# Patient Record
Sex: MALE | Race: WHITE | Employment: FULL TIME | URBAN - METROPOLITAN AREA
[De-identification: names, ages, dates, MRNs, and addresses within clinical notes are randomized per-mention and may not be internally consistent; named-entity substitution may affect disease eponyms.]

---

## 2017-05-07 ENCOUNTER — HOSPITAL ENCOUNTER (OUTPATIENT)
Age: 42
Discharge: HOME | End: 2017-05-07
Attending: NURSE PRACTITIONER
Payer: OTHER GOVERNMENT

## 2017-05-07 DIAGNOSIS — M54.10 RADICULOPATHY AFFECTING UPPER EXTREMITY: ICD-10-CM

## 2017-05-07 DIAGNOSIS — R20.2 PARESTHESIA OF LEFT UPPER EXTREMITY: ICD-10-CM

## 2017-05-07 PROCEDURE — 73221 MRI JOINT UPR EXTREM W/O DYE: CPT

## 2018-09-11 ENCOUNTER — OFFICE VISIT (OUTPATIENT)
Dept: ORTHOPEDIC SURGERY | Age: 43
End: 2018-09-11

## 2018-09-11 VITALS
DIASTOLIC BLOOD PRESSURE: 86 MMHG | BODY MASS INDEX: 25.33 KG/M2 | TEMPERATURE: 98 F | HEART RATE: 61 BPM | OXYGEN SATURATION: 100 % | WEIGHT: 187 LBS | HEIGHT: 72 IN | RESPIRATION RATE: 16 BRPM | SYSTOLIC BLOOD PRESSURE: 123 MMHG

## 2018-09-11 DIAGNOSIS — R20.0 NUMBNESS OF LEFT HAND: ICD-10-CM

## 2018-09-11 DIAGNOSIS — M62.838 MUSCLE SPASM: ICD-10-CM

## 2018-09-11 DIAGNOSIS — M47.812 CERVICAL FACET JOINT SYNDROME: ICD-10-CM

## 2018-09-11 DIAGNOSIS — M48.02 CERVICAL SPINAL STENOSIS: ICD-10-CM

## 2018-09-11 DIAGNOSIS — M48.02 FORAMINAL STENOSIS OF CERVICAL REGION: Primary | ICD-10-CM

## 2018-09-11 RX ORDER — METHYLPREDNISOLONE 4 MG/1
TABLET ORAL
Qty: 1 DOSE PACK | Refills: 0 | Status: SHIPPED | OUTPATIENT
Start: 2018-09-11 | End: 2018-11-29 | Stop reason: ALTCHOICE

## 2018-09-11 NOTE — MR AVS SNAPSHOT
[address] [facility] [address] [facility] [address] 
[phone] Patient: Regine Galo MRN: [id number] :1975 Visit Information Date & Time Provider Department Dept. Phone Encounter #  
 2018  9:00 AM Panda Dubon, [facility] [phone] [id number] Follow-up Instructions Return in about 6 weeks (around 10/23/2018) for PT follow up, Medication follow up on Thursday. Upcoming Health Maintenance Date Due Pneumococcal 19-64 Medium Risk (1 of 1 - PPSV23) 1993 DTaP/Tdap/Td series (1 - Tdap) 1995 Influenza Age 5 to Adult 2018 Allergies as of 2018  Review Complete On: 2018 By: Janae Wood LPN Severity Noted Reaction Type Reactions Ciprofloxacin  2017    Other (comments) Pcn [Penicillins]  2017    Other (comments) Penicillins  2015    Hives Current Immunizations  Never Reviewed No immunizations on file. Not reviewed this visit You Were Diagnosed With   
  
 Codes Comments Foraminal stenosis of cervical region    -  Primary ICD-10-CM: M99.81 ICD-9-CM: 723.0 Numbness of left hand     ICD-10-CM: R20.0 ICD-9-CM: 782.0 Cervical facet joint syndrome (HCC)     ICD-10-CM: M46.92 
ICD-9-CM: 723.8 Vitals BP Pulse Temp Resp Height(growth percentile) Weight(growth percentile) 123/86 (BP 1 Location: Left arm, BP Patient Position: Sitting) 61 98 °F (36.7 °C) (Oral) 16 6' (1.829 m) 187 lb (84.8 kg) SpO2 BMI Smoking Status 100% 25.36 kg/m2 Never Smoker Vitals History BMI and BSA Data Body Mass Index Body Surface Area  
 25.36 kg/m 2 2.08 m 2 Preferred Pharmacy Pharmacy Name Phone [facility], [address] [phone] Your Updated Medication List  
  
   
 This list is accurate as of 9/11/18 10:38 AM.  Always use your most recent med list.  
  
  
  
  
 ADVAIR DISKUS 100-50 mcg/dose diskus inhaler Generic drug:  fluticasone-salmeterol Take 1 Puff by inhalation every twelve (12) hours. buPROPion  mg XL tablet Commonly known as:  WELLBUTRIN XL  
  
 methylPREDNISolone 4 mg tablet Commonly known as:  Arlene Sawantjuliann Per dose pack instructions SYNTHROID 150 mcg tablet Generic drug:  levothyroxine Prescriptions Sent to Pharmacy Refills  
 methylPREDNISolone (MEDROL DOSEPACK) 4 mg tablet 0 Sig: Per dose pack instructions Class: Normal  
 [facility] [id number] [address] [facility], [address] Ph #: [phone] We Performed the Following AMB SUPPLY ORDER [[id number] Custom] Comments:  
 Left Wrist Splint REFERRAL TO PHYSICAL THERAPY [UBI41 Custom] Comments:  
 Eval/Treat Cervical PT, Dry Needling Follow-up Instructions Return in about 6 weeks (around 10/23/2018) for PT follow up, Medication follow up on Thursday. Referral Information Referral ID Referred By Referred To  
  
 [id number] [facility] [address] [facility]   
   [facility] [address] [place], [address] [place] [address] Phone: [phone] Visits Status Start Date End Date 1 New Request 9/11/18 9/11/19 If your referral has a status of pending review or denied, additional information will be sent to support the outcome of this decision. Patient Instructions Neck Arthritis: Exercises Your Care Instructions Here are some examples of typical rehabilitation exercises for your condition. Start each exercise slowly. Ease off the exercise if you start to have pain. Your doctor or physical therapist will tell you when you can start these exercises and which ones will work best for you. How to do the exercises Neck stretches to the side 1. This stretch works best if you keep your shoulder down as you lean away from it. To help you remember to do this, start by relaxing your shoulders and lightly holding on to your thighs or your chair. 2. Tilt your head toward your shoulder and hold for 15 to 30 seconds. Let the weight of your head stretch your muscles. 3. Repeat 2 to 4 times toward each shoulder. Chin tuck 1. Lie on the floor with a rolled-up towel under your neck. Your head should be touching the floor. 2. Slowly bring your chin toward your chest. 
3. Hold for a count of 6, and then relax for up to 10 seconds. 4. Repeat 8 to 12 times. Active cervical rotation 1. Sit in a firm chair, or stand up straight. 2. Keeping your chin level, turn your head to the right, and hold for 15 to 30 seconds. 3. Turn your head to the left and hold for 15 to 30 seconds. 4. Repeat 2 to 4 times to each side. Shoulder blade squeeze 1. While standing, squeeze your shoulder blades together. 2. Do not raise your shoulders up as you are squeezing. 3. Hold for 6 seconds. 4. Repeat 8 to 12 times. Shoulder rolls 1. Sit comfortably with your feet shoulder-width apart. You can also do this exercise standing up. 2. Roll your shoulders up, then back, and then down in a smooth, circular motion. 3. Repeat 2 to 4 times. Follow-up care is a key part of your treatment and safety. Be sure to make and go to all appointments, and call your doctor if you are having problems. It's also a good idea to know your test results and keep a list of the medicines you take. Where can you learn more? Go to http://aubrey-ruben.info/. Enter E991 in the search box to learn more about \"Neck Arthritis: Exercises. \" Current as of: November 29, 2017 Content Version: 11.7 © 3660-8236 [employer].  Care instructions adapted under license by [facility] (which disclaims liability or warranty for this information). If you have questions about a medical condition or this instruction, always ask your healthcare professional. [address] any warranty or liability for your use of this information. Introducing [facility] SERVICES! [employer] introduces [employer] patient portal. Now you can access parts of your medical record, email your doctor's office, and request medication refills online. 1. In your internet browser, go to [email or web address] 2. Click on the First Time User? Click Here link in the Sign In box. You will see the New Member Sign Up page. 3. Enter your [employer] Access Code exactly as it appears below. You will not need to use this code after youve completed the sign-up process. If you do not sign up before the expiration date, you must request a new code. · [employer] Access Code: [email or web address] Expires: 10/3/2018 12:32 PM 
 
4. Enter the last four digits of your Social Security Number (xxxx) and Date of Birth (mm/dd/yyyy) as indicated and click Submit. You will be taken to the next sign-up page. 5. Create a [employer] ID. This will be your [employer] login ID and cannot be changed, so think of one that is secure and easy to remember. 6. Create a [employer] password. You can change your password at any time. 7. Enter your Password Reset Question and Answer. This can be used at a later time if you forget your password. 8. Enter your e-mail address. You will receive e-mail notification when new information is available in [address]. 9. Click Sign Up. You can now view and download portions of your medical record. 10. Click the Download Summary menu link to download a portable copy of your medical information. If you have questions, please visit the Frequently Asked Questions section of the [employer] website. Remember, [employer] is NOT to be used for urgent needs. For medical emergencies, dial 911. Now available from your iPhone and Android! Please provide this summary of care documentation to your next provider. Your primary care clinician is listed as Don Lacy. If you have any questions after today's visit, please call [phone].

## 2018-09-11 NOTE — PROGRESS NOTES
Patient tried on multiple sizes of LT Wrist Splint. Patient will need a Large Left Spectral Wrist Splint, however, we do not have any in stock. More Wrist Splints were ordered by our Supplier , patient aware, patient willing to come back to office when wrist splints are available.

## 2018-09-11 NOTE — PROGRESS NOTES
[facility]  [address], [facility] [address]  Phone: [phone]  Fax: [phone]    NEW PATIENT  Pt's YOB: 1975    ASSESSMENT   Diagnoses and all orders for this visit:    1. Foraminal stenosis of cervical region  -     REFERRAL TO PHYSICAL THERAPY    2. Numbness of left hand  -     REFERRAL TO PHYSICAL THERAPY  -     Generic Supply Order    3. Cervical facet joint syndrome (HCC)  -     methylPREDNISolone (MEDROL DOSEPACK) 4 mg tablet; Per dose pack instructions  -     REFERRAL TO PHYSICAL THERAPY    4. Cervical spinal stenosis    5. Muscle spasm         IMPRESSION AND PLAN:  Keli Hein is a 39 y.o. male with history of neck pain. He complains of neck pain and pain across the upper back. Pt also complains of headaches and numbness in the left hand. Particularly the thumb and index fingers. He tried physical therapy in the past with traction but has never tried dry needling. 1) Pt was given information on cervical arthritis exercises. 2) He was referred to cervical physical therapy with dry needling. 3) Pt was given information on how to use a TheraCane. 4) He was prescribed a Medrol Dosepak. 5) Pt was prescribed a left wrist splint. 6) He will discuss starting Celebrex with his PCP, Dr. July Bernardo. 7) Mr. Hakan Finley has a reminder for a \"due or due soon\" health maintenance. I have asked that he contact his primary care provider, Jerad Bustillos MD, for follow-up on this health maintenance. 8)  demonstrated consistency with prescribing. 9) Pt will follow-up in 6 weeks or sooner if needed. HISTORY OF PRESENT ILLNESS:  Keli Hein is a 39 y.o. male with history of neck pain. He has been followed by Dr. Stephenie Mcardle and presents to the office today for a second opinion. In 2006 he woke up with severe pain in the right upper back.  Pt notes that he has experienced upper back pain and headaches daily since this incident but this is generally well managed with exercise. In 04/2017, he woke up with pain radiating down the left arm with numbness in the left hand (particularly the thumb and index fingers) when extending the neck. He notes that he experiences pain in the neck and upper back when he lays on his right side so he generally lays on his back. Pt denies any weakness in the arms or legs but complains of intermittent cramping in the arms. He admits to relief with the Bakody's maneuver. Pt reports that he has a SLAP tear in the left shoulder. He followed up with Dr. Ihsan Mann who referred him to Dr. José Miguel Marmolejo. Pt states that Dr. José Miguel Marmolejo recommended he had C6-7 disc replacement. He had an EMG a couple months ago, was referred to a hand specialist, and was told that he does not have carpal tunnel. Of note, patient is a  and notes that his left hand numbness occasionally interferes with work. 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 He tried cervical physical therapy and traction about a year ago and has home traction unit. Pt notes that he tried facet injections while serving in [employer] but is unsure if they were effective (noting that he was in minimal pain the day of the procedure). He has discussed trying a RFA in the past. Pt has tried trigger point injections in the past but has not tried dry needling. He has a history of stomach ulcers so he notes that he has avoided NSAID's. Pt states that he has been taking Tylenol as needed. Pt at this time desires to proceed with medication evaluation and a left wrist splint. 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     Pain Scale: 2/10     PCP: Kumar Ansari MD    Past Medical History:   Diagnosis Date    Asthma     Graves disease     Ulcer         Social History     Social History    Marital status: UNKNOWN     Spouse name: N/A    Number of children: N/A    Years of education: N/A     Occupational History    Not on file. Social History Main Topics    Smoking status: Never Smoker    Smokeless tobacco: Never Used    Alcohol use Yes    Drug use: Not on file    Sexual activity: Not on file     Other Topics Concern    Not on file     Social History Narrative       Current Outpatient Prescriptions   Medication Sig Dispense Refill    methylPREDNISolone (MEDROL DOSEPACK) 4 mg tablet Per dose pack instructions 1 Dose Pack 0    buPROPion XL (WELLBUTRIN XL) 300 mg XL tablet       fluticasone-salmeterol (ADVAIR DISKUS) 100-50 mcg/dose diskus inhaler Take 1 Puff by inhalation every twelve (12) hours.  SYNTHROID 150 mcg tablet          Allergies   Allergen Reactions    Ciprofloxacin Other (comments)    Pcn [Penicillins] Other (comments)    Penicillins Hives       REVIEW OF SYSTEMS    Constitutional: Negative for fever, chills, or weight change. Respiratory: Negative for cough or shortness of breath. Cardiovascular: Negative for chest pain or palpitations. Gastrointestinal: Negative for acid reflux, change in bowel habits, or constipation. Genitourinary: Negative for dysuria and flank pain. Musculoskeletal: Positive for cervical pain. Skin: Negative for rash. Neurological: Positive for headaches. Negative for dizziness. Positive for numbness in the left hand. Endo/Heme/Allergies: Negative for increased bruising. Psychiatric/Behavioral: Positive for difficulty with sleep. PHYSICAL EXAMINATION  Visit Vitals    /86 (BP 1 Location: Left arm, BP Patient Position: Sitting)    Pulse 61    Temp 98 °F (36.7 °C) (Oral)    Resp 16    Ht 6' (1.829 m)    Wt 187 lb (84.8 kg)    SpO2 100%    BMI 25.36 kg/m2       Constitutional: Awake, alert, and in no acute distress. HEENT: Normocephalic. Atraumatic. Oropharynx is moist and clear. PERRL. EOMI.  Sclerae are nonicteric  Heart: Regular rate and rhythm  Lungs: Clear to auscultation bilaterally  Abdomen: Soft and nontender. Bowel sounds are present  Neurological: 1+ symmetrical DTRs in the upper extremities. 1+ symmetrical DTRs in the lower extremities. Sensation to light touch is intact. Negative Penny's sign bilaterally. Skin: warm, dry, and intact. Musculoskeletal: Tight across the upper trapezii with scattered trigger points. Positive Bakody's sign. Decreased range of motion with left cervical flexion; otherwise good range of motion. Pain with cervical extension. Good range of motion of both shoulders. No tenderness to palpation in the lower lumbar region. No pain with extension, axial loading, or forward flexion. No pain with internal or external rotation of his hips. Negative straight leg raise bilaterally. Biceps  Triceps Deltoids Wrist Ext Wrist Flex Hand Intrin   Right +4/5 +4/5 +4/5 +4/5 +4/5 +4/5   Left +4/5 +4/5 +4/5 +4/5 +4/5 +4/5      Hip Flex  Quads Hamstrings Ankle DF EHL Ankle PF   Right +4/5 +4/5 +4/5 +4/5 +4/5 +4/5   Left +4/5 +4/5 +4/5 +4/5 +4/5 +4/5     IMAGING:    Cervical spine MRI from 05/21/2017 was personally reviewed with the patient and demonstrated:  FINDINGS:  DEGENERATIVE DISEASE: Mild disc desiccation and loss of disc space height throughout the cervical spine. Alignment good. OSSEOUS STRUCTURES:  No evidence of fracture or suspicious bone lesion. Faint STIR hyperintensity within the C5 and C6 vertebral bodies, without discrete lesion. CORD AND CRANIOCERVICAL JUNCTION:   No signal abnormality or contour deforming mass. SPINAL CANAL AND NEURAL FORAMEN (axial images obtained from C2/3 through C7/T1):    C2/3: No disc protrusion and no spinal canal or neural foramen stenosis.       C3/4: No disc protrusion or canal stenosis. Mild bilateral degenerative neural foramen stenosis.       C4/5: No disc protrusion or canal stenosis.  Uncovertebral hypertrophy mild to moderately narrows the right neural foramen.      C5/6: No disc protrusion or canal stenosis. Uncovertebral hypertrophy mild to moderately narrows both neural foramen, slightly worse on the right.      C6/7: Minimal disc bulge, mildly narrows the spinal canal without significant cord deformity. Uncovertebral hypertrophy severely narrows the left and moderate to severely narrows the right neural foramen.      C7/T1: No disc protrusion and no spinal canal or neural foramen stenosis.      IMPRESSION:  1. C6/7: High-grade bilateral neural foramen stenosis. Mild canal stenosis. 2. No significant spinal canal or neural foramen stenosis at any other level. 3. Faint signal abnormality within the C5 and C6 vertebral bodies, without discrete lesion. While of unclear etiology, in the absence of known primary malignancy finding very likely benign, possibly reactive degenerative marrow changes. Left upper extremity EMG from 07/20/2018 was personally reviewed with the patient and demonstrated: This EMG study, as performed, shows a subtle abnormality in the form of the absence of H-reflex, as well as prolonged F-latency, from the ulnar nerve; however, there is no hard evidence to suggest cervical radiculopathy. No distal entrapment neuropathy is notes. Written by Angie Drake, as dictated by Eleanor Barnard MD.  I, Dr. Eleanor Barnard confirm that all documentation is accurate.

## 2018-09-11 NOTE — PATIENT INSTRUCTIONS
Neck Arthritis: Exercises  Your Care Instructions  Here are some examples of typical rehabilitation exercises for your condition. Start each exercise slowly. Ease off the exercise if you start to have pain. Your doctor or physical therapist will tell you when you can start these exercises and which ones will work best for you. How to do the exercises  Neck stretches to the side    1. This stretch works best if you keep your shoulder down as you lean away from it. To help you remember to do this, start by relaxing your shoulders and lightly holding on to your thighs or your chair. 2. Tilt your head toward your shoulder and hold for 15 to 30 seconds. Let the weight of your head stretch your muscles. 3. Repeat 2 to 4 times toward each shoulder. Chin tuck    1. Lie on the floor with a rolled-up towel under your neck. Your head should be touching the floor. 2. Slowly bring your chin toward your chest.  3. Hold for a count of 6, and then relax for up to 10 seconds. 4. Repeat 8 to 12 times. Active cervical rotation    1. Sit in a firm chair, or stand up straight. 2. Keeping your chin level, turn your head to the right, and hold for 15 to 30 seconds. 3. Turn your head to the left and hold for 15 to 30 seconds. 4. Repeat 2 to 4 times to each side. Shoulder blade squeeze    1. While standing, squeeze your shoulder blades together. 2. Do not raise your shoulders up as you are squeezing. 3. Hold for 6 seconds. 4. Repeat 8 to 12 times. Shoulder rolls    1. Sit comfortably with your feet shoulder-width apart. You can also do this exercise standing up. 2. Roll your shoulders up, then back, and then down in a smooth, circular motion. 3. Repeat 2 to 4 times. Follow-up care is a key part of your treatment and safety. Be sure to make and go to all appointments, and call your doctor if you are having problems. It's also a good idea to know your test results and keep a list of the medicines you take.   Where can you learn more? Go to http://aubrey-ruben.info/. Enter D274 in the search box to learn more about \"Neck Arthritis: Exercises. \"  Current as of: November 29, 2017  Content Version: 11.7  © 0262-8564 [employer]. Care instructions adapted under license by [employer] (which disclaims liability or warranty for this information). If you have questions about a medical condition or this instruction, always ask your healthcare professional. [address] any warranty or liability for your use of this information.

## 2018-09-24 ENCOUNTER — APPOINTMENT (OUTPATIENT)
Dept: PHYSICAL THERAPY | Age: 43
End: 2018-09-24

## 2018-10-03 ENCOUNTER — HOSPITAL ENCOUNTER (OUTPATIENT)
Dept: PHYSICAL THERAPY | Age: 43
Discharge: HOME | End: 2018-10-03
Payer: OTHER GOVERNMENT

## 2018-10-03 PROCEDURE — 97162 PT EVAL MOD COMPLEX 30 MIN: CPT

## 2018-10-03 PROCEDURE — 97014 ELECTRIC STIMULATION THERAPY: CPT

## 2018-10-03 PROCEDURE — 97110 THERAPEUTIC EXERCISES: CPT

## 2018-10-03 NOTE — PROGRESS NOTES
[address] [facility] PHYSICAL THERAPY AT [address] [place] [address]  Phone: [phone] Fax: [phone] / [address] [address] PHYSICAL THERAPY SERVICES  Patient Name: Dante Pickering : 1975   Medical   Diagnosis: Chronic upper back pain [M54.9, G89.29] Treatment Diagnosis: CS and TS myofascial pain with radiculopathy    Onset Date:      Referral Source: Avtar Owens MD Start of Care [facility]): 10/3/2018   Prior Hospitalization: See medical history Provider #: [id number]   Prior Level of Function: Functional I    Comorbidities: Thyroid , asthma    Medications: Verified on Patient Summary List   The Plan of Care and following information is based on the information from the initial evaluation.   ========================================================================  Assessment / key information:  Pt is a 39y.o. year old male who presents with co CS/ TS pain started in  insidious onset (awoke with pain) with progressive worsening to include N/T (thumb and forefinger) and shooting pains into the L UE. Past treatment included PT for strengthening and traction (no change in sx), facet block and epidural injections without lasting pain relief. Pain management via Tylenol and ice as needed and home trigger point release. PMH significant for SLAP tear L shoulder. MRI show moderate to severe narrowing of C6-7 and moderate narrowing at C5-6. Current deficits include: increased pain to 8/10 at worst, pain with CS R rotation, B TTP of medial scapula, decreased postural strength SA 4/5, LT 3+/5, and + Spurling L to UE pain. Functional deficits include: sleeping on R side- nightly sleep interruption, work duties: carrying back pack with tools, prolonged looking up. Home exercise program initiated on initial evaluation to address these deficits.  Pt would benefit from PT to address these deficits for increased functional mobility and QOL.  ========================================================================  Eval Complexity: History: MEDIUM  Complexity : 1-2 comorbidities / personal factors will impact the outcome/ POC Exam:HIGH Complexity : 4+ Standardized tests and measures addressing body structure, function, activity limitation and / or participation in recreation  Presentation: MEDIUM Complexity : Evolving with changing characteristics  Clinical Decision Making:MEDIUM Complexity : FOTO score of 26-74Overall Complexity:MEDIUM  Problem List: pain affecting function, decrease strength, impaired gait/ balance, decrease ADL/ functional abilitiies, decrease activity tolerance, decrease flexibility/ joint mobility and other FOTO 63   Treatment Plan may include any combination of the following: Therapeutic exercise, Therapeutic activities, Neuromuscular re-education, Physical agent/modality, Gait/balance training, Manual therapy, Aquatic therapy, Patient education, Self Care training, Functional mobility training, Home safety training, Stair training and Other: IMT/ dry needling   Patient / Family readiness to learn indicated by: asking questions, trying to perform skills and interest  Persons(s) to be included in education: patient (P)  Barriers to Learning/Limitations: None  Measures taken:    Patient Goal (s): \"reduce upper back pain \"   Patient self reported health status: good  Rehabilitation Potential: good   Short Term Goals: To be accomplished in  1  weeks:  1. Pt will be independent and compliant with HEP   Long Term Goals: To be accomplished in  8-12 treatments:  1. Patient will increase FOTO score to 71/100 for indications of increased functional mobility. 2.  Patient will demo increased SA strength to 4+/5 for improved postural strength with work duties   3. Patient will demo LT strength 4/5 for improve stability with R SL for sleeping comfort   4.  Patient will demo negative L Spurlings for decreased radicular sx with ADLs and IADLs   Frequency / Duration:   Patient to be seen  2-3  times per week for 8-12  treatments:  Patient / Caregiver education and instruction: self care, activity modification and exercises  G-Codes (GP): ling  Therapist Signature: Sharmila Narayanan PT Date: 88/0/5482   Certification Period: na Time: 1059AM   ========================================================================  I certify that the above Physical Therapy Services are being furnished while the patient is under my care. I agree with the treatment plan and certify that this therapy is necessary. Physician Signature:        Date:       Time:   Please sign and return to [facility] or you may fax the signed copy to [phone]. Thank you.

## 2018-10-03 NOTE — PROGRESS NOTES
PT LUMBAR EVAL AND TREATMENT     Patient Name: Jair Hilario  Date:10/3/2018  : 1975  [x]  Patient  Verified  Payor: SHARRI / Plan: To [employer] DEPENDENTS / Product Type: Bunny Cristobal /    In Magdalena Kincaids time:1051  Total Treatment Time (min): 50  Visit #: 1 of     Treatment Area: Chronic upper back pain [M54.9, G89.29]    SUBJECTIVE  Pain Level (0-10 scale): (C): 2  (B):1  (W):  8  Any medication changes, allergies to medications, diagnosis change, or new procedure performed: see summary sheet for update  Subjective functional status/changes  CHIEF COMPLAINT: Patient reports with co CS/ TS pain started in  insidious onset (awoke with pain) with progressive worsening to include N/T (thumb and forefinger) and shooting pains into the L UE. Past treatment included PT for strengthening and traction (no change in sx), facet block and epidural injections without lasting pain relief. Pain management via Tylenol and ice as needed and home trigger point release. PMH significant for SLAP tear L shoulder. MRI show moderate to severe narrowing of C6-7 and moderate narrowing at C5-6.     Functional Status  Present functional limitations: sleeping on R side- nightly sleep interruption, work duties: carrying back pack with tools, prolonged looking up  Mechanism of injury: unknown   Symptoms:  Aggravated by: see functional limitations   Eased by: massage     Past History/Treatments:  PT   Diagnostic Tests: MRI last year     OBJECTIVE  Posture:  Slightly rounded shoulders     Active Movements:  CS extension 45 deg  Flexion WNL   Pain with R rotation     Palpation TTP B medial scap    Strength  SA 4/5  MT 5/5  LT 3+/5    Special Tests   Compression negative   Spurling's + L       Other tests/comments:      Patient Education/ Therapeutic Exercise : [x] Discussed POT including PT expectation, established HEP with pictures and instruction  (minutes) : 8    Manual NT awaiting IMT request     Modality (rationale): decrease pain and m tension    [x]  E-Stim: type HV to B rhomboid - 10 min with cold pack     Pain Level (0-10 scale) post treatment: 1/10    ASSESSMENT  [x]  See Plan of Care    PLAN  [x]  Upgrade activities as tolerated      [x] Other:_  POC 2-3 X 8-12  Panda Oro, PT 10/3/2018      Justification for Eval Code Complexity:  Patient History : chronicity   Examination see exam   Clinical Presentation: evolving sec to multiple avenues of treatment   Clinical Decision Making : FOTO : 63 /100

## 2018-10-11 ENCOUNTER — APPOINTMENT (OUTPATIENT)
Dept: PHYSICAL THERAPY | Age: 43
End: 2018-10-11
Payer: OTHER GOVERNMENT

## 2018-10-15 ENCOUNTER — HOSPITAL ENCOUNTER (OUTPATIENT)
Dept: PHYSICAL THERAPY | Age: 43
Discharge: HOME | End: 2018-10-15
Payer: OTHER GOVERNMENT

## 2018-10-15 PROCEDURE — 97110 THERAPEUTIC EXERCISES: CPT

## 2018-10-15 PROCEDURE — 97140 MANUAL THERAPY 1/> REGIONS: CPT

## 2018-10-15 NOTE — PROGRESS NOTES
PT DAILY TREATMENT NOTE     Patient Name: Nahed Grubbs  Date:10/15/2018  : 1975  [x]  Patient  Verified  Payor:  / Plan: [facility]  RETIREES AND DEPENDENTS / Product Type:  /    In time:932  Out time:1023  Total Treatment Time (min): 51  Visit #: 2 of     Treatment Area: Chronic upper back pain [M54.9, G89.29]    SUBJECTIVE  Pain Level (0-10 scale): 3  Any medication changes, allergies to medications, adverse drug reactions, diagnosis change, or new procedure performed?: [x] No    [] Yes (see summary sheet for update)  Subjective functional status/changes:   [] No changes reported  \"About the same. \"    OBJECTIVE  Modality rationale: increase tissue extensibility to improve the patients ability to improve myofascial mobility to decrease pain and increase activity tolerance    Min Type Additional Details   NT [x] Estim: []Att   []Unatt        []TENS instruct                  []IFC  []Premod   []NMES                     [x]Other: HV []w/US   [x]w/ice   []w/heat  Position: seated  Location: CS    []  Traction: [] Cervical       []Lumbar                       [] Prone          []Supine                       []Intermittent   []Continuous Lbs:  [] before manual  [] after manual    []  Ultrasound: []Continuous   [] Pulsed                           []1MHz   []3MHz Location:  W/cm2:    []  Iontophoresis with dexamethasone         Location: [] Take home patch   [] In clinic   10 [x]  Ice     []  heat  []  Ice massage Position seated:  Location: R shoulder/ mid back     []  Vasopneumatic Device Pressure:       [] lo [] med [] hi   Temperature: [] lo [] med [] hi   [x] Skin assessment post-treatment:  [x]intact [x]redness- no adverse reaction       []redness - adverse reaction:       26 min Therapeutic Exercise:  [x] See flow sheet :Initiated ther ex per flow sheet    Rationale: increase ROM and increase strength to improve the patients ability to improve posture at home and work for decrease pain       15 (8 min charged) min Manual Therapy:  IMT consent   Ischemic compression, STM to needled area , TS PA mobs Technique:      [x] S/DTM []IASTM [x]PROM [] Passive Stretching   [x]manual TPR  [x] TDN (see objective; actual needle insertion time not billed)  []Jt manipulation:Gr I [] II []  III [] IV[] V[]  Treatment/Area:     Rationale:      decrease pain, increase ROM, increase tissue extensibility and decrease trigger points to improve patient's ability to improve myofascial tension to increase activity tolerance    Dry Needling Procedure Note    Dry Needle Session Number:  1    Procedure: An intramuscular manual therapy (dry needling) and a neuro-muscular re-education treatment was done to deactivate myofascial trigger points, with a 15/30 gauge solid filament needle, under aseptic technique. Indication(s): [] Muscle spasms [] Myalgia/Myositis  [] Muscle cramps      [] Muscle imbalances [] TMD (TMJ) [x] Myofascial pain & dysfunction     [] Other: __    Chart reviewed for the following:  I, Pegge Nissen, PT, have reviewed the medical history, summary list and precautions/contraindications for Trellis Ducking.      TIME OUT performed immediately prior to start of procedure:  955 (enter time the timeout was completed)  I, Pegge Nissen, PT, have performed the following reviews on Trellis Ducking prior to the start of the session:      [x] Patient was identified by name and date of birth    [x] Agreement on all muscles being treated was verified   [x] Purpose of dry needling, side effects, possible complications, risks and benefits were explained to the patient   [x] Procedure site(s) verified  [x] Patient was positioned for comfort and draped for privacy  [x] Informed Consent was signed (initial visit) and verified verbally (subsequent visits)  [x] Patient was instructed on the need to report the use of blood thinners and/or immunosuppressant medications  [x] How to respond to possible adverse effects of treatment  [x] Self treatment of post needling soreness: ice, heat (moist heat, heat wraps) and stretching  [x] Opportunity was given to ask any questions, all questions were answered            Treatment:  The following muscles were treated today:    Right: LS, UT, rhomboid major and minor    Left:      Patients response to todays treatment:   [x]  LTRs  []  Muscle Relaxation  []  Pain Relief  []  Decreased Tinnitus  []  Decreased HAs [x]  Post needling soreness []  Increased ROM   []  Other:          x min Patient Education: [x] Review HEP from IE        Other Objective/Functional Measures: Therex initiated today - first FU post eval.      Pain Level (0-10 scale) post treatment: 4 soreness     ASSESSMENT/Changes in Function: Patient not seen since 10/3 sec to illness. Patient tolerated initiation of therex well. 100% VC for form and technique for all newly introduced therex. Educated patient on post treatment IMT soreness and indications for movement, gentle stretching and ice as needed . Excellent LT, christopher of levator scap today. Noted TS hypomobility with PA mobs - add therex to address NV also       Patient will continue to benefit from skilled PT services to modify and progress therapeutic interventions, address functional mobility deficits, address ROM deficits, address strength deficits, analyze and address soft tissue restrictions, analyze and cue movement patterns, analyze and modify body mechanics/ergonomics and assess and modify postural abnormalities to attain remaining goals.      [x]  See Plan of Care  []  See progress note/recertification  []  See Discharge Summary         Progress towards goals / Updated goals:  FIRST FU TREATMENT - CONT PER POT TO EST GOALS 10/15/2018    PLAN  [x]  Upgrade activities as tolerated     []  Continue plan of care  [x]  Update interventions per flow sheet       []  Discharge due to:_  [x]  Other:_assess response to first FU treatment   Add TS mobility theresurjit Baum, PT 10/15/2018

## 2018-10-22 ENCOUNTER — HOSPITAL ENCOUNTER (OUTPATIENT)
Dept: PHYSICAL THERAPY | Age: 43
Discharge: HOME | End: 2018-10-22
Payer: OTHER GOVERNMENT

## 2018-10-22 PROCEDURE — 97110 THERAPEUTIC EXERCISES: CPT

## 2018-10-22 PROCEDURE — 97140 MANUAL THERAPY 1/> REGIONS: CPT

## 2018-10-22 NOTE — PROGRESS NOTES
PT DAILY TREATMENT NOTE     Patient Name: Elham Montgomery  Date:10/22/2018  : 1975  [x]  Patient  Verified  Payor:  / Plan: [facility]  RETIREES AND DEPENDENTS / Product Type: Rachana Crisp /    In time:1210 Out time:110  Total Treatment Time (min): 60  Visit #: 3 of     Treatment Area: Chronic upper back pain [M54.9, G89.29]    SUBJECTIVE  Pain Level (0-10 scale): 1  Any medication changes, allergies to medications, adverse drug reactions, diagnosis change, or new procedure performed?: [x] No    [] Yes (see summary sheet for update)  Subjective functional status/changes:   [] No changes reported  \"I was sore the next day, but after that I think it really helped. \"    OBJECTIVE  Modality rationale: increase tissue extensibility to improve the patients ability to improve myofascial mobility to decrease pain and increase activity tolerance    Type Additional Details   [x]  Ice     []  heat  []  Ice massage Position seated:  Location: R shoulder/ mid back    [x] Skin assessment post-treatment:  [x]intact [x]redness- no adverse reaction       []redness - adverse reaction:       35 min Therapeutic Exercise:  [x] See flow sheet :Initiated ther ex per flow sheet    Rationale: increase ROM and increase strength to improve the patients ability to improve posture at home and work for decrease pain       15(10 min charged) min Manual Therapy:  IMT consent   Ischemic compression, STM to needled area , TS PA mobs  Technique:      [x] S/DTM []IASTM [x]PROM [] Passive Stretching   [x]manual TPR  [x] TDN (see objective; actual needle insertion time not billed)  []Jt manipulation:Gr I [] II []  III [] IV[] V[]  Treatment/Area:     Rationale:      decrease pain, increase ROM, increase tissue extensibility and decrease trigger points to improve patient's ability to improve myofascial tension to increase activity tolerance    Dry Needling Procedure Note    Dry Needle Session Number:  2    Procedure:    An intramuscular manual therapy (dry needling) and a neuro-muscular re-education treatment was done to deactivate myofascial trigger points, with a 15/30 gauge solid filament needle, under aseptic technique. Indication(s): [] Muscle spasms [] Myalgia/Myositis  [] Muscle cramps      [x] Muscle imbalances [] TMD (TMJ) [x] Myofascial pain & dysfunction     [] Other: __    Chart reviewed for the following:  IHilario PT, have reviewed the medical history, summary list and precautions/contraindications for Brent Rodolfo.      TIME OUT performed immediately prior to start of procedure:  1220pm (enter time the timeout was completed)  Hilario TAPIA PT, have performed the following reviews on Brent Rodolfo prior to the start of the session:      [x] Patient was identified by name and date of birth    [x] Agreement on all muscles being treated was verified   [x] Purpose of dry needling, side effects, possible complications, risks and benefits were explained to the patient   [x] Procedure site(s) verified  [x] Patient was positioned for comfort and draped for privacy  [x] Informed Consent was signed (initial visit) and verified verbally (subsequent visits)  [x] Patient was instructed on the need to report the use of blood thinners and/or immunosuppressant medications  [x] How to respond to possible adverse effects of treatment  [x] Self treatment of post needling soreness: ice, heat (moist heat, heat wraps) and stretching  [x] Opportunity was given to ask any questions, all questions were answered            Treatment:  The following muscles were treated today:    Right: LS, UT, rhomboid major and minor     Left:      Patients response to todays treatment:   [x]  LTRs  []  Muscle Relaxation  []  Pain Relief  []  Decreased Tinnitus  []  Decreased HAs [x]  Post needling soreness []  Increased ROM   []  Other:          x min Patient Education: [x] Review HEP  - ADDED AROM x 3 with 3#     Other Objective/Functional Measures: Added FR therex to adderss TS immobility    STG 1 -HEP 1x per day compliance      Pain Level (0-10 scale) post treatment: 1 \"soreness\"    ASSESSMENT/Changes in Function: Patient reports first IMT session caused normal 24 hour m soreness, but improved pain levels after. Patient presents today with decreased pain levels. Patient tolerated treatment progression well. Intermittent VCing to avoid UT compensation and chin tuck with AROM x 3 with 3#. Cont excellent LTR of needles m today. Improved TS mobility post therex and MT, but one area of protrusion of SP and hypomobility around T 4-6. Patient will continue to benefit from skilled PT services to modify and progress therapeutic interventions, address functional mobility deficits, address ROM deficits, address strength deficits, analyze and address soft tissue restrictions, analyze and cue movement patterns, analyze and modify body mechanics/ergonomics and assess and modify postural abnormalities to attain remaining goals. [x]  See Plan of Care  []  See progress note/recertification  []  See Discharge Summary         Progress towards goals / Updated goals: · Short Term Goals: To be accomplished in  1  weeks:  1. Pt will be independent and compliant with HEP - Goal progressing - HEP est with no questions. HEP will be modified and progressed as appropriate 10/22/18  · Long Term Goals: To be accomplished in  8-12 treatments:  1. Patient will increase FOTO score to 71/100 for indications of increased functional mobility. 2.  Patient will demo increased SA strength to 4+/5 for improved postural strength with work duties   3. Patient will demo LT strength 4/5 for improve stability with R SL for sleeping comfort   4.  Patient will demo negative L Spurlings for decreased radicular sx with ADLs and IADLs       PLAN  [x]  Upgrade activities as tolerated     []  Continue plan of care  [x]  Update interventions per flow sheet []  Discharge due to:_  [x]  Other:_add prone therex progression (T and Y )       Joe Ramirez, PT 10/22/2018

## 2018-10-29 ENCOUNTER — APPOINTMENT (OUTPATIENT)
Dept: PHYSICAL THERAPY | Age: 43
End: 2018-10-29
Payer: OTHER GOVERNMENT

## 2018-11-02 ENCOUNTER — APPOINTMENT (OUTPATIENT)
Dept: PHYSICAL THERAPY | Age: 43
End: 2018-11-02

## 2018-11-05 ENCOUNTER — HOSPITAL ENCOUNTER (OUTPATIENT)
Dept: PHYSICAL THERAPY | Age: 43
End: 2018-11-05

## 2018-11-09 ENCOUNTER — HOSPITAL ENCOUNTER (OUTPATIENT)
Dept: PHYSICAL THERAPY | Age: 43
End: 2018-11-09

## 2018-11-13 NOTE — PROGRESS NOTES
[address] [facility] [address] [facility] PHYSICAL THERAP[facility] AT [address] [place], [place] [address]  Phone: [phone] Fax 21 [phone] SUMMARY  Patient Name: Shadia Schreiber : 1975   Treatment/Medical Diagnosis: Chronic upper back pain [M54.9, G89.29]   Referral Source: Saira Kraus MD     Date of Initial Visit: 10/3/18 Attended Visits: 3 Missed Visits: 3     SUMMARY OF TREATMENT  Patient was being treated for CS and TS radiculopathy. Patient was seen for initial evaluation and 2 FU sessions. Patient responded well with IMT/ dry needling sessions, however self DC after 3 missed visits due to time contraints and travel. Unable to formally assess goals sec to non return. DC at this time. RECOMMENDATIONS  Discontinue therapy due to lack of attendance or compliance. Gcode: NA  If you have any questions/comments please contact us directly at [phone]. Thank you for allowing us to assist in the care of your patient.   Therapist Signature: Star Louise PT Date: 18   Reporting Period: na Time: [id number]

## 2018-11-29 ENCOUNTER — OFFICE VISIT (OUTPATIENT)
Dept: ORTHOPEDIC SURGERY | Age: 43
End: 2018-11-29

## 2018-11-29 VITALS
HEIGHT: 72 IN | TEMPERATURE: 98.7 F | SYSTOLIC BLOOD PRESSURE: 119 MMHG | BODY MASS INDEX: 26.28 KG/M2 | OXYGEN SATURATION: 99 % | HEART RATE: 87 BPM | DIASTOLIC BLOOD PRESSURE: 82 MMHG | WEIGHT: 194 LBS

## 2018-11-29 DIAGNOSIS — R20.0 NUMBNESS OF LEFT HAND: ICD-10-CM

## 2018-11-29 DIAGNOSIS — M48.02 CERVICAL SPINAL STENOSIS: ICD-10-CM

## 2018-11-29 DIAGNOSIS — M48.02 FORAMINAL STENOSIS OF CERVICAL REGION: Primary | ICD-10-CM

## 2018-11-29 DIAGNOSIS — M79.18 MYOFASCIAL PAIN: ICD-10-CM

## 2018-11-29 RX ORDER — GABAPENTIN 300 MG/1
CAPSULE ORAL
Qty: 60 CAP | Refills: 2 | Status: SHIPPED | OUTPATIENT
Start: 2018-11-29 | End: 2019-01-02

## 2018-11-29 NOTE — PROGRESS NOTES
[facility]  [address], [facility] [address]  Phone: [phone]  Fax: [phone]    Pt's YOB: 1975    ASSESSMENT   Diagnoses and all orders for this visit:    1. Foraminal stenosis of cervical region    2. Cervical spinal stenosis    3. Numbness of left hand  -     gabapentin (NEURONTIN) 300 mg capsule; Take 1 in the evening for 1 week then increase to 2 as directed    4. Myofascial pain         IMPRESSION AND PLAN:  Francisco Lipscomb is a 39 y.o. male with history of cervical pain and presents to the office today for follow up. Pt reports minimal change when he tried physical therapy with dry needling since his last office. He notes intermittent numbness in the left thumb and index finger. 1) Pt was given information on cervical arthritis exercises. 2) He was prescribed Neurontin 300 mg 2 tabs QHS, tapering up as directed. 3) Pt may continue taking Tylenol as needed. 4) He was given information on how to use a TheraCane. 5) Pt was referred to cervical physical therapy with dry needling. 6) Mr. Tressa Basilio has a reminder for a \"due or due soon\" health maintenance. I have asked that he contact his primary care provider, Jessica Norman MD, for follow-up on this health maintenance. 7)  demonstrated consistency with prescribing. 8) Pt will follow-up in 3 months or sooner if needed. HISTORY OF PRESENT ILLNESS:  Francisco Lipscomb is a 39 y.o. male with history of cervical pain and presents to the office today for follow up. Pt reports minimal change when he tried physical therapy with dry needling since his last office. Pt states that he was only able to undergo 3 sessions of dry needling due to his travel schedule. He notes intermittent numbness in the left thumb and index finger. Pt reports numbness/tingling in the left hand if he jerks his head backwards.  He notes pain in the right upper back but denies any left neck, upper back, or arm pain. Pt denies any weakness in the arms at this time. Of note, he works for the [employer] with [employer]. He recently went to [place] and notes pain when trying to sleep with his neck extended during the flight. He was prescribed Neurontin in the past but was hesitant about taking the medication due to its side effects. Pt takes Tylenol as needed and notes stomach upset when taking NSAID's. Pt at this time desires to proceed with medication evaluation. Pain Scale: /10    PCP: Checo Conrad MD     Past Medical History:   Diagnosis Date    Asthma     Graves disease     Ulcer         Social History     Socioeconomic History    Marital status:      Spouse name: Not on file    Number of children: Not on file    Years of education: Not on file    Highest education level: Not on file   Social Needs    Financial resource strain: Not on file    Food insecurity - worry: Not on file    Food insecurity - inability: Not on file    Transportation needs - medical: Not on file   [employer] needs - non-medical: Not on file   Occupational History    Not on file   Tobacco Use    Smoking status: Never Smoker    Smokeless tobacco: Never Used   Substance and Sexual Activity    Alcohol use: Yes    Drug use: No    Sexual activity: Not on file   Other Topics Concern    Not on file   Social History Narrative    Not on file       Current Outpatient Medications   Medication Sig Dispense Refill    methylPREDNISolone (MEDROL DOSEPACK) 4 mg tablet Per dose pack instructions 1 Dose Pack 0    buPROPion XL (WELLBUTRIN XL) 300 mg XL tablet       fluticasone-salmeterol (ADVAIR DISKUS) 100-50 mcg/dose diskus inhaler Take 1 Puff by inhalation every twelve (12) hours.       SYNTHROID 150 mcg tablet          Allergies   Allergen Reactions    Ciprofloxacin Other (comments)    Pcn [Penicillins] Other (comments)    Penicillins Hives         REVIEW OF SYSTEMS    Constitutional: Negative for fever, chills, or weight change. Respiratory: Negative for cough or shortness of breath. Cardiovascular: Negative for chest pain or palpitations. Gastrointestinal: Negative for acid reflux, change in bowel habits, or constipation. Genitourinary: Negative for dysuria and flank pain. Musculoskeletal: Positive for cervical pain. Skin: Negative for rash. Neurological: Positive for numbness in the left thumb and index finger. Negative for headaches or dizziness. Endo/Heme/Allergies: Negative for increased bruising. Psychiatric/Behavioral: Negative for difficulty with sleep. PHYSICAL EXAMINATION  Visit Vitals  /82   Pulse 87   Temp 98.7 °F (37.1 °C) (Oral)   Ht 6' (1.829 m)   Wt 194 lb (88 kg)   SpO2 99%   BMI 26.31 kg/m²       Constitutional: Awake, alert, and in no acute distress. Neurological: 1+ symmetrical DTRs in the upper extremities. 1+ symmetrical DTRs in the lower extremities. Sensation to light touch is intact. Negative Penny's sign bilaterally. Skin: warm, dry, and intact. Musculoskeletal:Tight across the upper trapezii with multiple trigger points. Good range of motion in both shoulders. No pain with extension, axial loading, or forward flexion. No pain with internal or external rotation of his hips. Negative straight leg raise bilaterally. Biceps  Triceps Deltoids Wrist Ext Wrist Flex Hand Intrin   Right +4/5 +4/5 +4/5 +4/5 +4/5 +4/5   Left +4/5 +4/5 +4/5 +4/5 +4/5 +4/5      Hip Flex  Quads Hamstrings Ankle DF EHL Ankle PF   Right +4/5 +4/5 +4/5 +4/5 +4/5 +4/5   Left +4/5 +4/5 +4/5 +4/5 +4/5 +4/5     IMAGING:    Cervical spine MRI from 05/21/2017 was personally reviewed with the patient and demonstrated:  FINDINGS:  DEGENERATIVE DISEASE: Mild disc desiccation and loss of disc space height throughout the cervical spine. Alignment good. OSSEOUS STRUCTURES:  No evidence of fracture or suspicious bone lesion.  Faint STIR hyperintensity within the C5 and C6 vertebral bodies, without discrete lesion. CORD AND CRANIOCERVICAL JUNCTION:   No signal abnormality or contour deforming mass. SPINAL CANAL AND NEURAL FORAMEN (axial images obtained from C2/3 through C7/T1):    C2/3: No disc protrusion and no spinal canal or neural foramen stenosis.       C3/4: No disc protrusion or canal stenosis. Mild bilateral degenerative neural foramen stenosis.       C4/5: No disc protrusion or canal stenosis. Uncovertebral hypertrophy mild to moderately narrows the right neural foramen.      C5/6: No disc protrusion or canal stenosis. Uncovertebral hypertrophy mild to moderately narrows both neural foramen, slightly worse on the right.      C6/7: Minimal disc bulge, mildly narrows the spinal canal without significant cord deformity. Uncovertebral hypertrophy severely narrows the left and moderate to severely narrows the right neural foramen.      C7/T1: No disc protrusion and no spinal canal or neural foramen stenosis.        IMPRESSION:  1. C6/7: High-grade bilateral neural foramen stenosis. Mild canal stenosis. 2. No significant spinal canal or neural foramen stenosis at any other level. 3. Faint signal abnormality within the C5 and C6 vertebral bodies, without discrete lesion. While of unclear etiology, in the absence of known primary malignancy finding very likely benign, possibly reactive degenerative marrow changes.      Left upper extremity EMG from 07/20/2018 was personally reviewed with the patient and demonstrated: This EMG study, as performed, shows a subtle abnormality in the form of the absence of H-reflex, as well as prolonged F-latency, from the ulnar nerve; however, there is no hard evidence to suggest cervical radiculopathy. No distal entrapment neuropathy is notes. Written by Velia Snehal, as dictated by Jose Roberto Junior MD.  I, Dr. Jose Roberto Junior confirm that all documentation is accurate.

## 2018-11-29 NOTE — PROGRESS NOTES
As per Dr. Mallory Card request, theracane demonstration, education, and handout provided to patient.

## 2018-12-19 ENCOUNTER — APPOINTMENT (OUTPATIENT)
Dept: PHYSICAL THERAPY | Age: 43
End: 2018-12-19

## 2018-12-27 ENCOUNTER — HOSPITAL ENCOUNTER (OUTPATIENT)
Dept: PHYSICAL THERAPY | Age: 43
End: 2018-12-27

## 2019-02-15 RX ORDER — GABAPENTIN 300 MG/1
300 CAPSULE ORAL 2 TIMES DAILY
Qty: 180 CAP | Refills: 0 | Status: SHIPPED | OUTPATIENT
Start: 2019-02-15 | End: 2019-02-21

## 2021-07-06 NOTE — PROGRESS NOTES
[facility]  [address] [facility], [facility]   Phone: [phone]  Fax: [phone]    Pt's YOB: 1975    ASSESSMENT   Diagnoses and all orders for this visit:    1. Neck pain  -     AMB POC XRAY, SPINE, CERVICAL; 2 OR 3  -     MRI CERV SPINE WO CONT; Future  -     methylPREDNISolone (MEDROL DOSEPACK) 4 mg tablet; Per dose pack instructions    2. Cervical spinal stenosis  -     MRI CERV SPINE WO CONT; Future    3. Myofascial pain  -     MRI CERV SPINE WO CONT; Future    4. Numbness of left hand  -     MRI CERV SPINE WO CONT; Future         IMPRESSION AND PLAN:  Lea Pabon is a 54 y.o. right hand dominant male with history of cervical pain. He notes the pain in his upper back radiating down the left arm recently returned and has worsened. Pt also reports numbness and weakness in the left arm. He takes Neurontin 400 mg TID and trazodone as needed. 1) Pt was given information on cervical arthritis exercises. 2) He will continue taking Neurontin 400 mg as prescribed. Discussed increasing this if needed. 3) A cervical MRI was ordered since he has progressive cervical pain with left radicular symptoms and left hand numbness in a C4 distribution. 4) Discussed cervical injections pending MRI findings. 5) Pt was prescribed a Medrol Dosepak prn severe pain. 6) Mr. Evan Granda has a reminder for a \"due or due soon\" health maintenance. I have asked that he contact his primary care provider, Lucille Sheppard DO, for follow-up on this health maintenance. 7)  demonstrated consistency with prescribing. Follow-up and Dispositions    · Return in about 3 weeks (around 7/29/2021) for Diagnostic Test follow up. HISTORY OF PRESENT ILLNESS:  Lea Pabno is a 54 y.o. right hand dominant male with history of cervical pain and presents to the office today for follow up.  He notes his improvement in his pain since last office visit on 11/29/2018 but reports that he continued to experience numbness in the left hand. Pt notes that the pain in his upper back radiating down the left arm recently returned and has worsened. He also notes that he has been dropping objects recently. Pt takes Neurontin 400 mg TID with improvement in his pain but minimal relief in his hand numbness. He notes he received both doses of the COVID-19 vaccination and he takes vitamin D3. Pt denies any difficulty with sleep and takes trazodone as needed. He denies any relief in his numbness/tingling in the left arm when taking a Medrol Dosepak. Pt at this time desires to proceed with a cervical MRI. Of note, he is a  working for the [employer] which [place] travel [place]. Pt notes that his current job requires some sedentary work but also bending and stooping into cramped areas. He states that he is in the process of starting a new job. He had a trip to [facility] scheduled but this was cancelled due to COVID-19. Pain Scale: 3/10    PCP: Maye Nino DO     Past Medical History:   Diagnosis Date    Anxiety     Asthma     Back pain     DJD (degenerative joint disease) of cervical spine     Duodenal ulcer     Dysphagia     ED (erectile dysfunction)     Fatigue     Graves disease     HLD (hyperlipidemia)     Night terror disorder     Tremor of unknown origin     Ulcer         Social History     Socioeconomic History    Marital status:      Spouse name: Not on file    Number of children: Not on file    Years of education: Not on file    Highest education level: Not on file   Occupational History    Not on file   Tobacco Use    Smoking status: Never Smoker    Smokeless tobacco: Never Used   Substance and Sexual Activity    Alcohol use:  Yes    Drug use: No    Sexual activity: Not on file   Other Topics Concern    Not on file   Social History Narrative    Not on file     Social Determinants of Health     Financial Resource Strain:     Difficulty of Paying Living Expenses:    Food Insecurity:     Worried About Running Out of Food in the Last Year:     [address] in the Last Year:    Transportation Needs:     Lack of Transportation (Medical):  Lack of Transportation (Non-Medical):    Physical Activity:     Days of Exercise per Week:     Minutes of Exercise per Session:    Stress:     Feeling of Stress :    Social Connections:     Frequency of Communication with Friends and Family:     Frequency of Social Gatherings with Friends and Family:     Attends [employer] Services:     Active Member of Clubs or Organizations:     Attends Club or Organization Meetings:     Marital Status:    Intimate Partner Violence:     Fear of Current or Ex-Partner:     Emotionally Abused:     Physically Abused:     Sexually Abused:        Current Outpatient Medications   Medication Sig Dispense Refill    gabapentin (NEURONTIN) 400 mg capsule       traZODone (DESYREL) 50 mg tablet       methylPREDNISolone (MEDROL DOSEPACK) 4 mg tablet Per dose pack instructions 1 Dose Pack 0    fluticasone-salmeterol (ADVAIR DISKUS) 100-50 mcg/dose diskus inhaler Take 1 Puff by inhalation every twelve (12) hours.  SYNTHROID 150 mcg tablet          Allergies   Allergen Reactions    Ciprofloxacin Other (comments), Itching and Rash    Pcn [Penicillins] Other (comments)    Penicillins Hives and Swelling         REVIEW OF SYSTEMS    Constitutional: Negative for fever, chills, or weight change. Respiratory: Negative for cough or shortness of breath. Cardiovascular: Negative for chest pain or palpitations. Gastrointestinal: Negative for acid reflux, change in bowel habits, or constipation. Genitourinary: Negative for dysuria and flank pain. Musculoskeletal: Positive for cervical pain. Neurological: Negative for headaches or dizziness. Positive for numbness. Endo/Heme/Allergies: Negative for increased bruising.    Psychiatric/Behavioral: Negative for difficulty with sleep. As per HPI    PHYSICAL EXAMINATION  Visit Vitals  /77 (BP 1 Location: Left upper arm)   Pulse 69   Temp 98.9 °F (37.2 °C)   Resp 17   Wt 208 lb (94.3 kg)   SpO2 98%   BMI 29.01 kg/m²       Constitutional: Awake, alert, and in no acute distress. Neurological: 1+ symmetrical DTRs in the upper extremities. 1+ symmetrical DTRs in the lower extremities. Sensation to light touch is intact. Negative Doss's sign bilaterally. Skin: warm, dry, and intact. Musculoskeletal: Tight across the upper trapezii with multiple trigger points. Positive Spurling's test on the left. Positive Bakody's sign on the left. Good range of motion in both shoulders. Biceps  Triceps Deltoids Wrist Ext Wrist Flex Hand Intrin   Right +4/5 +4/5 +4/5 +4/5 +4/5 +4/5   Left +4/5 +4/5 +4/5 +4/5 +4/5 +4/5     IMAGING:    Cervical spine 2V x-rays from 7/8/2021 were personally reviewed with the patient and demonstrated:  Mild degenerative changes. Good alignment. Cervical spine MRI from 05/21/2017 was personally reviewed with the patient and demonstrated:  FINDINGS:  DEGENERATIVE DISEASE: Mild disc desiccation and loss of disc space height throughout the cervical spine. Alignment good. OSSEOUS STRUCTURES:  No evidence of fracture or suspicious bone lesion. Faint STIR hyperintensity within the C5 and C6 vertebral bodies, without discrete lesion. CORD AND CRANIOCERVICAL JUNCTION:   No signal abnormality or contour deforming mass. SPINAL CANAL AND NEURAL FORAMEN (axial images obtained from C2/3 through C7/T1):    C2/3: No disc protrusion and no spinal canal or neural foramen stenosis.       C3/4: No disc protrusion or canal stenosis. Mild bilateral degenerative neural foramen stenosis.       C4/5: No disc protrusion or canal stenosis. Uncovertebral hypertrophy mild to moderately narrows the right neural foramen.      C5/6: No disc protrusion or canal stenosis.  Uncovertebral hypertrophy mild to moderately narrows both neural foramen, slightly worse on the right.      C6/7: Minimal disc bulge, mildly narrows the spinal canal without significant cord deformity. Uncovertebral hypertrophy severely narrows the left and moderate to severely narrows the right neural foramen.      C7/T1: No disc protrusion and no spinal canal or neural foramen stenosis.        IMPRESSION:  1. C6/7: High-grade bilateral neural foramen stenosis. Mild canal stenosis. 2. No significant spinal canal or neural foramen stenosis at any other level. 3. Faint signal abnormality within the C5 and C6 vertebral bodies, without discrete lesion. While of unclear etiology, in the absence of known primary malignancy finding very likely benign, possibly reactive degenerative marrow changes.      Left upper extremity EMG from 07/20/2018 was personally reviewed with the patient and demonstrated: This EMG study, as performed, shows a subtle abnormality in the form of the absence of H-reflex, as well as prolonged F-latency, from the ulnar nerve; however, there is no hard evidence to suggest cervical radiculopathy. No distal entrapment neuropathy is notes.     Written by Angel Ochoa, as dictated by Ronald Manley MD.  I, Dr. Ronald Manley confirm that all documentation is accurate.

## 2021-07-08 ENCOUNTER — OFFICE VISIT (OUTPATIENT)
Dept: ORTHOPEDIC SURGERY | Age: 46
End: 2021-07-08
Payer: OTHER GOVERNMENT

## 2021-07-08 VITALS
OXYGEN SATURATION: 98 % | BODY MASS INDEX: 29.01 KG/M2 | RESPIRATION RATE: 17 BRPM | DIASTOLIC BLOOD PRESSURE: 77 MMHG | WEIGHT: 208 LBS | TEMPERATURE: 98.9 F | SYSTOLIC BLOOD PRESSURE: 113 MMHG | HEART RATE: 69 BPM

## 2021-07-08 DIAGNOSIS — R20.0 NUMBNESS OF LEFT HAND: ICD-10-CM

## 2021-07-08 DIAGNOSIS — M48.02 CERVICAL SPINAL STENOSIS: ICD-10-CM

## 2021-07-08 DIAGNOSIS — M54.2 NECK PAIN: Primary | ICD-10-CM

## 2021-07-08 DIAGNOSIS — M79.18 MYOFASCIAL PAIN: ICD-10-CM

## 2021-07-08 PROCEDURE — 72040 X-RAY EXAM NECK SPINE 2-3 VW: CPT | Performed by: PHYSICAL MEDICINE & REHABILITATION

## 2021-07-08 PROCEDURE — 99213 OFFICE O/P EST LOW 20 MIN: CPT | Performed by: PHYSICAL MEDICINE & REHABILITATION

## 2021-07-08 RX ORDER — METHYLPREDNISOLONE 4 MG/1
TABLET ORAL
Qty: 1 DOSE PACK | Refills: 0 | Status: SHIPPED | OUTPATIENT
Start: 2021-07-08 | End: 2021-07-29 | Stop reason: ALTCHOICE

## 2021-07-08 RX ORDER — TRAZODONE HYDROCHLORIDE 50 MG/1
TABLET ORAL
Status: ON HOLD | COMMUNITY
Start: 2021-06-09 | End: 2022-01-25

## 2021-07-08 RX ORDER — GABAPENTIN 400 MG/1
CAPSULE ORAL
Status: ON HOLD | COMMUNITY
Start: 2021-06-07 | End: 2022-01-25

## 2021-07-08 NOTE — LETTER
7/13/2021    Patient: Erma Can   YOB: 1975   Date of Visit: 7/8/2021     Chandu Rivera MD  [place] [phone]  Via Fax: [phone]    Dear Chandu Rivera MD,      Thank you for referring Mr. Louann Marquis to [address] for evaluation. My notes for this consultation are attached. If you have questions, please do not hesitate to call me. I look forward to following your patient along with you.       Sincerely,    Beth Ramos MD

## 2021-07-08 NOTE — PATIENT INSTRUCTIONS
Neck Arthritis: Exercises  Introduction  Here are some examples of exercises for you to try. The exercises may be suggested for a condition or for rehabilitation. Start each exercise slowly. Ease off the exercises if you start to have pain. You will be told when to start these exercises and which ones will work best for you. How to do the exercises  Neck stretches to the side   1. This stretch works best if you keep your shoulder down as you lean away from it. To help you remember to do this, start by relaxing your shoulders and lightly holding on to your thighs or your chair. 2. Tilt your head toward your shoulder and hold for 15 to 30 seconds. Let the weight of your head stretch your muscles. 3. Repeat 2 to 4 times toward each shoulder. Chin tuck   1. Lie on the floor with a rolled-up towel under your neck. Your head should be touching the floor. 2. Slowly bring your chin toward your chest.  3. Hold for a count of 6, and then relax for up to 10 seconds. 4. Repeat 8 to 12 times. Active cervical rotation   1. Sit in a firm chair, or stand up straight. 2. Keeping your chin level, turn your head to the right, and hold for 15 to 30 seconds. 3. Turn your head to the left and hold for 15 to 30 seconds. 4. Repeat 2 to 4 times to each side. Shoulder blade squeeze   1. While standing, squeeze your shoulder blades together. 2. Do not raise your shoulders up as you are squeezing. 3. Hold for 6 seconds. 4. Repeat 8 to 12 times. Shoulder rolls   1. Sit comfortably with your feet shoulder-width apart. You can also do this exercise standing up. 2. Roll your shoulders up, then back, and then down in a smooth, circular motion. 3. Repeat 2 to 4 times. Follow-up care is a key part of your treatment and safety. Be sure to make and go to all appointments, and call your doctor if you are having problems. It's also a good idea to know your test results and keep a list of the medicines you take.   Where can you learn more? Go to http://www.gray.com/  Enter G151 in the search box to learn more about \"Neck Arthritis: Exercises. \"  Current as of: November 16, 2020               Content Version: 12.8  © 2272-0990 [employer]wise, Incorporated. Care instructions adapted under license by [employer] (which disclaims liability or warranty for this information). If you have questions about a medical condition or this instruction, always ask your healthcare professional. [address] any warranty or liability for your use of this information.

## 2021-07-15 DIAGNOSIS — M79.18 MYOFASCIAL PAIN: ICD-10-CM

## 2021-07-15 DIAGNOSIS — R20.0 NUMBNESS OF LEFT HAND: ICD-10-CM

## 2021-07-15 DIAGNOSIS — M48.02 CERVICAL SPINAL STENOSIS: ICD-10-CM

## 2021-07-15 DIAGNOSIS — M54.2 NECK PAIN: ICD-10-CM

## 2021-07-17 ENCOUNTER — HOSPITAL ENCOUNTER (OUTPATIENT)
Age: 46
Discharge: HOME | End: 2021-07-17
Attending: PHYSICAL MEDICINE & REHABILITATION
Payer: OTHER GOVERNMENT

## 2021-07-17 PROCEDURE — 72141 MRI NECK SPINE W/O DYE: CPT

## 2021-07-29 ENCOUNTER — OFFICE VISIT (OUTPATIENT)
Dept: ORTHOPEDIC SURGERY | Age: 46
End: 2021-07-29
Payer: OTHER GOVERNMENT

## 2021-07-29 VITALS
TEMPERATURE: 98.6 F | SYSTOLIC BLOOD PRESSURE: 119 MMHG | HEART RATE: 70 BPM | DIASTOLIC BLOOD PRESSURE: 82 MMHG | OXYGEN SATURATION: 98 % | RESPIRATION RATE: 18 BRPM | BODY MASS INDEX: 29.29 KG/M2 | WEIGHT: 210 LBS

## 2021-07-29 DIAGNOSIS — M47.812 CERVICAL FACET JOINT SYNDROME: ICD-10-CM

## 2021-07-29 DIAGNOSIS — M48.02 FORAMINAL STENOSIS OF CERVICAL REGION: Primary | ICD-10-CM

## 2021-07-29 DIAGNOSIS — M62.838 MUSCLE SPASM: ICD-10-CM

## 2021-07-29 DIAGNOSIS — M48.02 CERVICAL SPINAL STENOSIS: ICD-10-CM

## 2021-07-29 DIAGNOSIS — M54.12 CERVICAL RADICULOPATHY: ICD-10-CM

## 2021-07-29 PROCEDURE — 99213 OFFICE O/P EST LOW 20 MIN: CPT | Performed by: PHYSICAL MEDICINE & REHABILITATION

## 2021-07-29 NOTE — PATIENT INSTRUCTIONS
Neck Arthritis: Exercises  Introduction  Here are some examples of exercises for you to try. The exercises may be suggested for a condition or for rehabilitation. Start each exercise slowly. Ease off the exercises if you start to have pain. You will be told when to start these exercises and which ones will work best for you. How to do the exercises  Neck stretches to the side   1. This stretch works best if you keep your shoulder down as you lean away from it. To help you remember to do this, start by relaxing your shoulders and lightly holding on to your thighs or your chair. 2. Tilt your head toward your shoulder and hold for 15 to 30 seconds. Let the weight of your head stretch your muscles. 3. Repeat 2 to 4 times toward each shoulder. Chin tuck   1. Lie on the floor with a rolled-up towel under your neck. Your head should be touching the floor. 2. Slowly bring your chin toward your chest.  3. Hold for a count of 6, and then relax for up to 10 seconds. 4. Repeat 8 to 12 times. Active cervical rotation   1. Sit in a firm chair, or stand up straight. 2. Keeping your chin level, turn your head to the right, and hold for 15 to 30 seconds. 3. Turn your head to the left and hold for 15 to 30 seconds. 4. Repeat 2 to 4 times to each side. Shoulder blade squeeze   1. While standing, squeeze your shoulder blades together. 2. Do not raise your shoulders up as you are squeezing. 3. Hold for 6 seconds. 4. Repeat 8 to 12 times. Shoulder rolls   1. Sit comfortably with your feet shoulder-width apart. You can also do this exercise standing up. 2. Roll your shoulders up, then back, and then down in a smooth, circular motion. 3. Repeat 2 to 4 times. Follow-up care is a key part of your treatment and safety. Be sure to make and go to all appointments, and call your doctor if you are having problems. It's also a good idea to know your test results and keep a list of the medicines you take.   Where can you learn more? Go to http://www.gray.com/  Enter D470 in the search box to learn more about \"Neck Arthritis: Exercises. \"  Current as of: November 16, 2020               Content Version: 12.8  © 9581-5435 [employer]wise, Incorporated. Care instructions adapted under license by [employer] (which disclaims liability or warranty for this information). If you have questions about a medical condition or this instruction, always ask your healthcare professional. [address] any warranty or liability for your use of this information.

## 2021-07-29 NOTE — PROGRESS NOTES
[facility]  [address]   Phone: [phone]  Fax: [phone]    Pt's YOB: 1975    ASSESSMENT   Diagnoses and all orders for this visit:    1. Foraminal stenosis of cervical region  -     REFERRAL TO NEUROSURGERY    2. Cervical radiculopathy  -     REFERRAL TO NEUROSURGERY    3. Cervical facet joint syndrome  -     REFERRAL TO NEUROSURGERY    4. Cervical spinal stenosis    5. Muscle spasm         IMPRESSION AND PLAN:  Sawyer Connell is a 51 y.o. right hand dominant male with history of cervical pain. He notes progressive pain in the upper back that radiates down the left arm, particularly with left cervical flexion. Pt continues to take Neurontin 400 mg 1 cap TID without sedation or other side effects. 1) Pt was given information on cervical arthritis exercises. 2) He was referred to Dr. Codie Guadarrama for surgical evaluation at his request-- pt appears to have C6 radiculopathy. 3) Pt will continue taking Neurontin 400 mg 1 cap TID and does not need a refill. 4) Mr. Naye Cr has a reminder for a \"due or due soon\" health maintenance. I have asked that he contact his primary care provider, Michael Benton DO, for follow-up on this health maintenance. 5)  demonstrated consistency with prescribing. Follow-up and Dispositions    · Return in 2 months (on 9/29/2021) for Medication follow up. HISTORY OF PRESENT ILLNESS:  Sawyer Connell is a 51 y.o. right hand dominant male with history of cervical pain and presents to the office today for MRI follow up. He notes progressive pain in the upper back that radiates down the left arm, particularly with left cervical flexion. Pt notes difficulty with sleep secondary to pain. He notes that he will be switching jobs and wishes to proceed with surgical evaluation. Pt continues to take Neurontin 400 mg 1 cap TID without sedation or other side effects.  He admits to improvement in his radiating pain with the Neurontin and notes that his symptoms are now more numbness/tingling. Pt notes relief when previously taking prednisone and notes that he has a Medrol Dosepak at home. Pt at this time desires to continue with current care. Pain Scale: 3/10    PCP: Dashawn Walsh DO     Past Medical History:   Diagnosis Date    Anxiety     Asthma     Back pain     DJD (degenerative joint disease) of cervical spine     Duodenal ulcer     Dysphagia     ED (erectile dysfunction)     Fatigue     Graves disease     HLD (hyperlipidemia)     Night terror disorder     Tremor of unknown origin     Ulcer         Social History     Socioeconomic History    Marital status:      Spouse name: Not on file    Number of children: Not on file    Years of education: Not on file    Highest education level: Not on file   Occupational History    Not on file   Tobacco Use    Smoking status: Never Smoker    Smokeless tobacco: Never Used   Substance and Sexual Activity    Alcohol use: Yes    Drug use: No    Sexual activity: Not on file   Other Topics Concern    Not on file   Social History Narrative    Not on file     Social Determinants of Health     Financial Resource Strain:     Difficulty of Paying Living Expenses:    Food Insecurity:     Worried About Running Out of Food in the Last Year:     [address] in the Last Year:    Transportation Needs:     Lack of Transportation (Medical):      Lack of Transportation (Non-Medical):    Physical Activity:     Days of Exercise per Week:     Minutes of Exercise per Session:    Stress:     Feeling of Stress :    Social Connections:     Frequency of Communication with Friends and Family:     Frequency of Social Gatherings with Friends and Family:     Attends Islam Services:     Active Member of Clubs or Organizations:     Attends Club or Organization Meetings:     Marital Status:    Intimate Partner Violence:     Fear of Current or Ex-Partner:     Emotionally Abused:     Physically Abused:     Sexually Abused:        Current Outpatient Medications   Medication Sig Dispense Refill    gabapentin (NEURONTIN) 400 mg capsule       traZODone (DESYREL) 50 mg tablet       fluticasone-salmeterol (ADVAIR DISKUS) 100-50 mcg/dose diskus inhaler Take 1 Puff by inhalation every twelve (12) hours.  SYNTHROID 150 mcg tablet          Allergies   Allergen Reactions    Ciprofloxacin Other (comments), Itching and Rash    Pcn [Penicillins] Other (comments)    Penicillins Hives and Swelling         REVIEW OF SYSTEMS    Constitutional: Negative for fever, chills, or weight change. Respiratory: Negative for cough or shortness of breath. Cardiovascular: Negative for chest pain or palpitations. Gastrointestinal: Negative for acid reflux, change in bowel habits, or constipation. Genitourinary: Negative for dysuria and flank pain. Musculoskeletal: Positive for cervical pain. Neurological: Negative for headaches or dizziness. Positive for numbness. Endo/Heme/Allergies: Negative for increased bruising. Psychiatric/Behavioral: Negative for difficulty with sleep. As per HPI    PHYSICAL EXAMINATION  Visit Vitals  /82 (BP 1 Location: Left upper arm)   Pulse 70   Temp 98.6 °F (37 °C)   Resp 18   Wt 210 lb (95.3 kg)   SpO2 98%   BMI 29.29 kg/m²       Constitutional: Awake, alert, and in no acute distress. Neurological: 1+ symmetrical DTRs in the upper extremities. 1+ symmetrical DTRs in the lower extremities. Sensation to light touch is intact. Negative Doss's sign bilaterally. Skin: warm, dry, and intact. Musculoskeletal: Tight across the upper trapezii with multiple trigger points. Positive Spurling's test on the left. Positive Bakody's sign on the left. Good range of motion in both shoulders.       Biceps  Triceps Deltoids Wrist Ext Wrist Flex Hand Intrin   Right +4/5 +4/5 +4/5 +4/5 +4/5 +4/5   Left +4/5 +4/5 +4/5 +4/5 +4/5 +4/5     IMAGING:    Cervical spine MRI from 7/17/2021 was personally reviewed with the patient and demonstrated:  Results from Orders Only encounter on 07/15/21    MRI CERV SPINE WO CONT    Narrative  Sagittal and axial multisequence MR images of the cervical spine were obtained. HISTORY: Neck pain and left hand weakness. Normal alignment. No compression fracture. No pathologic marrow signal.  Paraspinal soft tissues are unremarkable. No Chiari I malformation. Spinal cord  shows normal signal intensity. C2-C3, C3-C4, C4-C5: No disc herniation, cord contact or central stenosis. Patent right foramina. Mild left foraminal stenosis with uncovertebral and facet  hypertrophy    C5-C6: Small left paracentral disc protrusion, contacting spinal cord. No cord  edema. With uncovertebral hypertrophy, moderate right foraminal stenosis. Patent  left foramen. C6-C7: Diffuse posterior disc bulge with mild central stenosis but no cord  contact. With uncovertebral and facet hypertrophy, severe left and moderately  severe right foraminal stenosis. C7-T1: No disc herniation, central or foraminal stenosis. Impression  1. Mild left foraminal stenosis with facet and uncovertebral facet hypertrophy  at C2-C3, C3-C4, C4-C5 , and worst at C6-C7. 2. Small left paracentral disc protrusion at C5-C6, contacting spinal cord. 3. Moderate right foraminal stenosis at C5-C6, moderately severe at C6-C7.     Left upper extremity EMG from 07/20/2018 was personally reviewed with the patient and demonstrated: This EMG study, as performed, shows a subtle abnormality in the form of the absence of H-reflex, as well as prolonged F-latency, from the ulnar nerve; however, there is no hard evidence to suggest cervical radiculopathy. No distal entrapment neuropathy is notes.        Written by Johnathan De Dios, as dictated by Alex Church MD.  I, Dr. Alex Church confirm that all documentation is accurate.

## 2021-07-29 NOTE — LETTER
7/30/2021    Patient: Rachel Nelson   YOB: 1975   Date of Visit: 7/29/2021     [facility]  [address] [facility] [address]  Via Fax: [phone]     Lyudmila Mazariegos, [address] [facility] [address]  Via Fax: [phone]    Dear Cornelia Frausto, Christ Quinn MD,      Thank you for referring Mr. Marian Marquez to [address] for evaluation. My notes for this consultation are attached. If you have questions, please do not hesitate to call me. I look forward to following your patient along with you.       Sincerely,    Pao Gamble MD

## 2021-08-20 ENCOUNTER — TELEPHONE (OUTPATIENT)
Dept: ORTHOPEDIC SURGERY | Age: 46
End: 2021-08-20

## 2021-08-20 NOTE — TELEPHONE ENCOUNTER
Patient called in stating he was told by [facility] they have not received the referral request for him to see the neurosurgeon. Patient is requesting this referral faxed over to [facility].     Patient's contact is [phone]

## 2021-08-23 ENCOUNTER — TELEPHONE (OUTPATIENT)
Dept: ORTHOPEDIC SURGERY | Age: 46
End: 2021-08-23

## 2021-08-23 NOTE — TELEPHONE ENCOUNTER
Returned call to patient, verified Name/, informed patient of notes being faxed over to Dr. Rachel Reynoso on 21, as well as again this morning by [facility]. Per patient, Dr. Mary Lou Grubbs office is still reporting not getting the notes. Patient offered to stop by office to  this information. Informed patient I see he has access to [facility]. Informed patient he can also get these records from his [employer]hart to take to Dr. Mary Lou Grubbs office. Patient verbalized agreement/understanding. No further action required at this time.

## 2021-08-23 NOTE — TELEPHONE ENCOUNTER
Patient called in again stating he needs his referral and office notes faxed over to [facility]. Patient did not have a fax number.     Patient's contact is [phone]

## 2021-08-23 NOTE — TELEPHONE ENCOUNTER
Patient was advised to obtain referral from [facility].  denied our request for this reason. See media. Patient is aware and expressed his understanding. Thank you.

## 2021-12-09 ENCOUNTER — OFFICE VISIT (OUTPATIENT)
Dept: ORTHOPEDIC SURGERY | Age: 46
End: 2021-12-09

## 2021-12-09 VITALS
OXYGEN SATURATION: 96 % | HEIGHT: 71 IN | BODY MASS INDEX: 28.7 KG/M2 | HEART RATE: 65 BPM | TEMPERATURE: 97 F | WEIGHT: 205 LBS

## 2021-12-09 RX ORDER — METHOCARBAMOL 500 MG/1
500 TABLET, FILM COATED ORAL
Status: ON HOLD | COMMUNITY
Start: 2021-09-30 | End: 2022-01-25

## 2021-12-09 RX ORDER — SILDENAFIL 50 MG/1
TABLET, FILM COATED ORAL
Status: ON HOLD | COMMUNITY
Start: 2021-10-03 | End: 2022-01-25

## 2021-12-09 NOTE — PROGRESS NOTES
Jamir Leo presents today for   Chief Complaint   Patient presents with    Follow-up     2 month       Is someone accompanying this pt? no    Is the patient using any DME equipment during OV? no    Depression Screening:  3 most recent PHQ Screens 11/29/2018   Little interest or pleasure in doing things Not at all   Feeling down, depressed, irritable, or hopeless Not at all   Total Score PHQ 2 0       Learning Assessment:  No flowsheet data found. Abuse Screening:  No flowsheet data found. Fall Risk  No flowsheet data found. OPIOID RISK TOOL  No flowsheet data found. Coordination of Care:  1. Have you been to the ER, urgent care clinic since your last visit? no  Hospitalized since your last visit? no    2. Have you seen or consulted any other health care providers outside of the [address] [place] [address] since your last visit? Yes, [facility]  Include any pap smears or colon screening.  Colonoscopy 3 months ago

## 2022-01-20 ENCOUNTER — OFFICE VISIT (OUTPATIENT)
Dept: GASTROENTEROLOGY | Age: 47
End: 2022-01-20
Payer: OTHER GOVERNMENT

## 2022-01-20 DIAGNOSIS — F41.9 ANXIETY: ICD-10-CM

## 2022-01-20 DIAGNOSIS — K21.9 GASTROESOPHAGEAL REFLUX DISEASE WITHOUT ESOPHAGITIS: Primary | ICD-10-CM

## 2022-01-20 DIAGNOSIS — Z87.19 HISTORY OF DUODENAL ULCER: ICD-10-CM

## 2022-01-20 DIAGNOSIS — K92.89 GAS BLOAT SYNDROME: ICD-10-CM

## 2022-01-20 DIAGNOSIS — R10.13 EPIGASTRIC PAIN: ICD-10-CM

## 2022-01-20 DIAGNOSIS — J45.909 ASTHMA, UNSPECIFIED ASTHMA SEVERITY, UNSPECIFIED WHETHER COMPLICATED, UNSPECIFIED WHETHER PERSISTENT: ICD-10-CM

## 2022-01-20 DIAGNOSIS — K62.5 RECTAL BLEEDING: ICD-10-CM

## 2022-01-20 PROCEDURE — 99203 OFFICE O/P NEW LOW 30 MIN: CPT | Performed by: INTERNAL MEDICINE

## 2022-01-20 RX ORDER — OMEPRAZOLE 20 MG/1
20 TABLET, DELAYED RELEASE ORAL DAILY
COMMUNITY

## 2022-01-20 NOTE — PROGRESS NOTES
Referring Physician:  La Farias DO     Chief Complaint: Abdominal pain and bloating and early satiety. Date of service: 01/20/22     Subjective:     History of Present Illness:  Patient is a male [address] [facility] 51 y.o.  who is seen for evaluation for abdominal pain and bloating. The patient has GI complaints of GI bleed due to a duodenal ulcer in 2001 due to H. Pylori and was treated. In 2018 he was seen by Dr. Angelina Leonardo at [facility] with complaints of reflux and dysphagia. The endoscopy apparently showed no real pathology other than scarring in the duodenum from prior ulcer disease, although I cannot find the actual report. Biopsies for eosinophilic esophagitis were negative. The patient reports she has intermittent epigastric pain as well as right upper quadrant pain. The RUQ pain can at times be sever and almost make him double over. Better with drinking cold water and stretching. Eating makes it worse, but no specific foods. The pain does not radiate and no nausea or vomiting. He also has gas and bloating. This occurs after eating and is fine when he awakens in the morning. He has intermittent reflux but denies any dysphagia at this time. He is on omeprazole 20 mg a day. His weight has remained stable. Laboratory evaluation shows a normal CBC and CMP and nutritional parameters are normal.  He has had occasional rectal bleeding due to internal hemorrhoids found at colonoscopy and underwent hemorrhoidal banding and no further bleeding. He complains of early satiety for the last year. He has not lost weight. The patient denies nausea, vomiting, fever, chills, change in bowel habits, diarrhea, constipation, weight loss, or melena. Last EGD-2018. Last colonoscopy - 9/2021. Family history for GI disease is significant for aunt had colon polyps. The patient denies liver related risk factors.     Past medical history is significant for anxiety, asthma, duodenal ulcer, dysphagia, Graves' disease, and hyperlipidemia. PMH:  Past Medical History:   Diagnosis Date    Anxiety     Asthma     Back pain     DJD (degenerative joint disease) of cervical spine     Duodenal ulcer     Dysphagia     ED (erectile dysfunction)     Fatigue     Graves disease     HLD (hyperlipidemia)     Night terror disorder     Tremor of unknown origin     Ulcer         PSH:  Past Surgical History:   Procedure Laterality Date    HX OTHER SURGICAL      Elbow-nerve    HX TONSILLECTOMY      HX VASECTOMY          Allergies: Allergies   Allergen Reactions    Ciprofloxacin Other (comments), Itching and Rash    Pcn [Penicillins] Other (comments)    Penicillins Hives and Swelling        Home Medications:  Cannot display prior to admission medications because the patient has not been admitted in this contact. Hospital Medications:  Current Outpatient Medications   Medication Sig    omeprazole (PriLOSEC OTC) 20 mg tablet Take 20 mg by mouth daily.  methocarbamoL (ROBAXIN) 500 mg tablet Take 500 mg by mouth every eight (8) hours as needed. (Patient not taking: Reported on 12/9/2021)    sildenafil citrate (VIAGRA) 50 mg tablet  (Patient not taking: Reported on 12/9/2021)    gabapentin (NEURONTIN) 400 mg capsule  (Patient not taking: Reported on 1/20/2022)    traZODone (DESYREL) 50 mg tablet  (Patient not taking: Reported on 12/9/2021)    fluticasone-salmeterol (ADVAIR DISKUS) 100-50 mcg/dose diskus inhaler Take 1 Puff by inhalation every twelve (12) hours. (Patient not taking: Reported on 1/20/2022)    SYNTHROID 150 mcg tablet  (Patient not taking: Reported on 1/20/2022)     No current facility-administered medications for this visit. Social History:  Social History     Tobacco Use    Smoking status: Never Smoker    Smokeless tobacco: Never Used   Substance Use Topics    Alcohol use: Yes        Pt denies any history of IV drug use, blood transfusions.     Family History:  History reviewed. No pertinent family history. Review of Systems:  A detailed 10 system ROS is obtained, with pertinent positives as listed above. All others are negative unless listed in history above. Constitutional denies fever chills, headache, or weight loss. Skin- denies lesions or rashes. HEENT- denies any vision or hearing problems, epistaxis, sore throat, or dental problems. Lungs- no shortness of breath or chest pain reported, no dyspnea on exertion. Cardiac- no palpitations or chest pain reported, including at rest or on exertion. GI-no abdominal pain, melena, rectal bleeding, reflux, dysphagia, jaundice, change in stool or urine color, constipation or diarrhea. Genitourinary -no dysuria or hematuria. Musculoskeletal-no muscle weakness or disuse or atrophy. Neurologic-no numbness, tingling, gait disturbance, or other abnormalities. Rheumatologic- patient denies any immune or rheumatologic diseases or symptoms. Endocrine- patient denies any endocrine abnormalities including thyroid disease or diabetes. Psychologic-patient denies depression, anxiety or emotional issues. No reported memory issues. Objective:     Physical Exam:  Vitals: There were no vitals taken for this visit. Gen:  Pt is alert, cooperative, no acute distress  Skin:  Extremities and face reveal no rashes. No kim erythema. No telangiectasias on the chest wall. HEENT: Sclerae anicteric. Extra-occular muscles are intact. No oral ulcers. No abnormal pigmentation of the lips. The neck is supple. Cardiovascular: Regular rate and rhythm. No murmurs, gallops, or rubs. Respiratory:  Comfortable breathing with no accessory muscle use. Clear breath sounds anteriorly with no wheezes, rales, or rhonchi. GI:  Abdomen nondistended, soft, and nontender. Normal active bowel sounds. No enlargement of the liver or spleen. No masses palpable. Rectal:  Deferred  Musculoskeletal:   No costovertebral tenderness. No localized muscle weakness, or decreased range of motion. Extremities:  No palpable cords or pitting edema of the lower legs. Extremities have good range of motion. Neurological:  Gross memory appears intact. Patient is alert and oriented. Psychiatric:  Mood appears appropriate with judgement intact. Lymphatic:  No cervical or supraclavicular adenopathy. Laboratory:   No results        CT scan of abdomen and pelvis - No results  CT Results (most recent):  Results from Abstract encounter on 10/15/18    CT SPINE CERV WO CONT         Abdominal US- No results  US Results (most recent):  Results from Abstract encounter on 02/07/17    US SCROTUM/TESTICLES           MRI and MRCP- No results. MRI Results (most recent):  Results from Orders Only encounter on 07/15/21    MRI CERV SPINE WO CONT    Narrative  Sagittal and axial multisequence MR images of the cervical spine were obtained. HISTORY: Neck pain and left hand weakness. Normal alignment. No compression fracture. No pathologic marrow signal.  Paraspinal soft tissues are unremarkable. No Chiari I malformation. Spinal cord  shows normal signal intensity. C2-C3, C3-C4, C4-C5: No disc herniation, cord contact or central stenosis. Patent right foramina. Mild left foraminal stenosis with uncovertebral and facet  hypertrophy    C5-C6: Small left paracentral disc protrusion, contacting spinal cord. No cord  edema. With uncovertebral hypertrophy, moderate right foraminal stenosis. Patent  left foramen. C6-C7: Diffuse posterior disc bulge with mild central stenosis but no cord  contact. With uncovertebral and facet hypertrophy, severe left and moderately  severe right foraminal stenosis. C7-T1: No disc herniation, central or foraminal stenosis. Impression  1. Mild left foraminal stenosis with facet and uncovertebral facet hypertrophy  at C2-C3, C3-C4, C4-C5 , and worst at C6-C7.   2. Small left paracentral disc protrusion at C5-C6, contacting spinal cord. 3. Moderate right foraminal stenosis at C5-C6, moderately severe at C6-C7. Assessment:     1. Abdominal pain. RUQ and epigastric in nature. Possibilities include recurrent ulcers, gastritis, esophagitis, dysfunctional gallbladder, or nonulcer dyspepsia. 2. Gas and bloating. I suspect if the above are not proving to be the case, then he likely has irritable bowel syndrome with associated gas and bloating, or diet (likes sweets). Anxiety may be a contributing factor. 3. Early satiety. No real risk factors to explain thi complaint, no weight loss. 4. Rectal bleeding. Due to internal hemorrhoids and resolved with banding 9/2021.  5. Anxiety. He is on trazodone daily. 6. Asthma. Plan:     1. EGD with MAC. I discussed the techniques involved with the procedure as well as the risks, benefits, and alternatives including but not limited to bleeding, infection, perforation requiring emergent surgery, missing lesions, death, and anesthesia related complications with the patient. All questions and concerns were answered. The patient voiced understanding and agrees to proceed. 2. CCK-Hida scan for dysfunctional gallbladder. 3. Continue Omeprazole 20 mg a day. Antireflux precautions. 4. Use IBgard 1-2 capsules as needed for gas. He travels a lot to foreign countries and takes a probiotic Saccaromyces when he travels and helps. Suggest when travels, use Probiotic, 5 different species minimum, with a dose of at least 20 billion CFU bacteria for now. Future considerations include increasing the probiotic dose. 5. If GI evaluation is unrevealing, unlikely this is a GI related problem, and suspect a non-GI etiology such as musculoskeletal in nature, or functional in nature. 6. Further recommendations pending the patient's clinical course, if needed. This includes gastric empyting study for persistent early satiety complaints.     7. The patient will follow up with me as needed.         Denise Lopez MD

## 2022-01-20 NOTE — H&P (VIEW-ONLY)
Referring Physician:  Shai Scruggs DO     Chief Complaint: Abdominal pain and bloating and early satiety. Date of service: 01/20/22     Subjective:     History of Present Illness:  Patient is a male [address] [facility] 51 y.o.  who is seen for evaluation for abdominal pain and bloating. The patient has GI complaints of GI bleed due to a duodenal ulcer in 2001 due to H. Pylori and was treated. In 2018 he was seen by Dr. Donna Pavon at [facility] with complaints of reflux and dysphagia. The endoscopy apparently showed no real pathology other than scarring in the duodenum from prior ulcer disease, although I cannot find the actual report. Biopsies for eosinophilic esophagitis were negative. The patient reports she has intermittent epigastric pain as well as right upper quadrant pain. The RUQ pain can at times be sever and almost make him double over. Better with drinking cold water and stretching. Eating makes it worse, but no specific foods. The pain does not radiate and no nausea or vomiting. He also has gas and bloating. This occurs after eating and is fine when he awakens in the morning. He has intermittent reflux but denies any dysphagia at this time. He is on omeprazole 20 mg a day. His weight has remained stable. Laboratory evaluation shows a normal CBC and CMP and nutritional parameters are normal.  He has had occasional rectal bleeding due to internal hemorrhoids found at colonoscopy and underwent hemorrhoidal banding and no further bleeding. He complains of early satiety for the last year. He has not lost weight. The patient denies nausea, vomiting, fever, chills, change in bowel habits, diarrhea, constipation, weight loss, or melena. Last [id number]. Last colonoscopy - 9/2021. Family history for GI disease is significant for aunt had colon polyps. The patient denies liver related risk factors.     Past medical history is significant for anxiety, asthma, duodenal ulcer, dysphagia, Graves' disease, and hyperlipidemia. PMH:  Past Medical History:   Diagnosis Date    Anxiety     Asthma     Back pain     DJD (degenerative joint disease) of cervical spine     Duodenal ulcer     Dysphagia     ED (erectile dysfunction)     Fatigue     Graves disease     HLD (hyperlipidemia)     Night terror disorder     Tremor of unknown origin     Ulcer         PSH:  Past Surgical History:   Procedure Laterality Date    HX OTHER SURGICAL      Elbow-nerve    HX TONSILLECTOMY      HX VASECTOMY          Allergies: Allergies   Allergen Reactions    Ciprofloxacin Other (comments), Itching and Rash    Pcn [Penicillins] Other (comments)    Penicillins Hives and Swelling        Home Medications:  Cannot display prior to admission medications because the patient has not been admitted in this contact. Hospital Medications:  Current Outpatient Medications   Medication Sig    omeprazole (PriLOSEC OTC) 20 mg tablet Take 20 mg by mouth daily.  methocarbamoL (ROBAXIN) 500 mg tablet Take 500 mg by mouth every eight (8) hours as needed. (Patient not taking: Reported on 12/9/2021)    sildenafil citrate (VIAGRA) 50 mg tablet  (Patient not taking: Reported on 12/9/2021)    gabapentin (NEURONTIN) 400 mg capsule  (Patient not taking: Reported on 1/20/2022)    traZODone (DESYREL) 50 mg tablet  (Patient not taking: Reported on 12/9/2021)    fluticasone-salmeterol (ADVAIR DISKUS) 100-50 mcg/dose diskus inhaler Take 1 Puff by inhalation every twelve (12) hours. (Patient not taking: Reported on 1/20/2022)    SYNTHROID 150 mcg tablet  (Patient not taking: Reported on 1/20/2022)     No current facility-administered medications for this visit. Social History:  Social History     Tobacco Use    Smoking status: Never Smoker    Smokeless tobacco: Never Used   Substance Use Topics    Alcohol use: Yes        Pt denies any history of IV drug use, blood transfusions.     Family History:  History reviewed. No pertinent family history. Review of Systems:  A detailed 10 system ROS is obtained, with pertinent positives as listed above. All others are negative unless listed in history above. Constitutional denies fever chills, headache, or weight loss. Skin- denies lesions or rashes. HEENT denies any vision or hearing problems, epistaxis, sore throat, or dental problems. Lungs- no shortness of breath or chest pain reported, no dyspnea on exertion. Cardiac- no palpitations or chest pain reported, including at rest or on exertion. GIno abdominal pain, melena, rectal bleeding, reflux, dysphagia, jaundice, change in stool or urine color, constipation or diarrhea. Genitourinary no dysuria or hematuria. Musculoskeletalno muscle weakness or disuse or atrophy. Neurologicno numbness, tingling, gait disturbance, or other abnormalities. Rheumatologic- patient denies any immune or rheumatologic diseases or symptoms. Endocrine patient denies any endocrine abnormalities including thyroid disease or diabetes. Psychologicpatient denies depression, anxiety or emotional issues. No reported memory issues. Objective:     Physical Exam:  Vitals: There were no vitals taken for this visit. Gen:  Pt is alert, cooperative, no acute distress  Skin:  Extremities and face reveal no rashes. No kim erythema. No telangiectasias on the chest wall. HEENT: Sclerae anicteric. Extra-occular muscles are intact. No oral ulcers. No abnormal pigmentation of the lips. The neck is supple. Cardiovascular: Regular rate and rhythm. No murmurs, gallops, or rubs. Respiratory:  Comfortable breathing with no accessory muscle use. Clear breath sounds anteriorly with no wheezes, rales, or rhonchi. GI:  Abdomen nondistended, soft, and nontender. Normal active bowel sounds. No enlargement of the liver or spleen. No masses palpable. Rectal:  Deferred  Musculoskeletal:   No costovertebral tenderness. No localized muscle weakness, or decreased range of motion. Extremities:  No palpable cords or pitting edema of the lower legs. Extremities have good range of motion. Neurological:  Gross memory appears intact. Patient is alert and oriented. Psychiatric:  Mood appears appropriate with judgement intact. Lymphatic:  No cervical or supraclavicular adenopathy. Laboratory:   No results        CT scan of abdomen and pelvis - No results  CT Results (most recent):  Results from Abstract encounter on 10/15/18    CT SPINE CERV WO CONT         Abdominal US- No results  US Results (most recent):  Results from Abstract encounter on 02/07/17    US SCROTUM/TESTICLES           MRI and MRCP- No results. MRI Results (most recent):  Results from Orders Only encounter on 07/15/21    MRI CERV SPINE WO CONT    Narrative  Sagittal and axial multisequence MR images of the cervical spine were obtained. HISTORY: Neck pain and left hand weakness. Normal alignment. No compression fracture. No pathologic marrow signal.  Paraspinal soft tissues are unremarkable. No Chiari I malformation. Spinal cord  shows normal signal intensity. C2-C3, C3-C4, C4-C5: No disc herniation, cord contact or central stenosis. Patent right foramina. Mild left foraminal stenosis with uncovertebral and facet  hypertrophy    C5-C6: Small left paracentral disc protrusion, contacting spinal cord. No cord  edema. With uncovertebral hypertrophy, moderate right foraminal stenosis. Patent  left foramen. C6-C7: Diffuse posterior disc bulge with mild central stenosis but no cord  contact. With uncovertebral and facet hypertrophy, severe left and moderately  severe right foraminal stenosis. C7-T1: No disc herniation, central or foraminal stenosis. Impression  1. Mild left foraminal stenosis with facet and uncovertebral facet hypertrophy  at C2-C3, C3-C4, C4-C5 , and worst at C6-C7.   2. Small left paracentral disc protrusion at C5-C6, contacting spinal cord. 3. Moderate right foraminal stenosis at C5-C6, moderately severe at C6-C7. Assessment:     1. Abdominal pain. RUQ and epigastric in nature. Possibilities include recurrent ulcers, gastritis, esophagitis, dysfunctional gallbladder, or nonulcer dyspepsia. 2. Gas and bloating. I suspect if the above are not proving to be the case, then he likely has irritable bowel syndrome with associated gas and bloating, or diet (likes sweets). Anxiety may be a contributing factor. 3. Early satiety. No real risk factors to explain thi complaint, no weight loss. 4. Rectal bleeding. Due to internal hemorrhoids and resolved with banding 9/2021.  5. Anxiety. He is on trazodone daily. 6. Asthma. Plan:     1. EGD with MAC. I discussed the techniques involved with the procedure as well as the risks, benefits, and alternatives including but not limited to bleeding, infection, perforation requiring emergent surgery, missing lesions, death, and anesthesia related complications with the patient. All questions and concerns were answered. The patient voiced understanding and agrees to proceed. 2. CCK-Hida scan for dysfunctional gallbladder. 3. Continue Omeprazole 20 mg a day. Antireflux precautions. 4. Use IBgard 1-2 capsules as needed for gas. He travels a lot to foreign countries and takes a probiotic Saccaromyces when he travels and helps. Suggest when travels, use Probiotic, 5 different species minimum, with a dose of at least 20 billion CFU bacteria for now. Future considerations include increasing the probiotic dose. 5. If GI evaluation is unrevealing, unlikely this is a GI related problem, and suspect a non-GI etiology such as musculoskeletal in nature, or functional in nature. 6. Further recommendations pending the patient's clinical course, if needed. This includes gastric empyting study for persistent early satiety complaints.     7. The patient will follow up with me as needed.         Gregory Wilson MD

## 2022-01-20 NOTE — PROGRESS NOTES
Mino Blair presents today for   Chief Complaint   Patient presents with    New Patient     Patient had a bleeding ulcer back in 2001. Recently early satiety, bloating, and right upper quadrant pain. No constipation/diarrhea       Is someone accompanying this pt? NO    Is the patient using any DME equipment during OV? NO    Depression Screening:  3 most recent PHQ Screens 1/20/2022   Little interest or pleasure in doing things Not at all   Feeling down, depressed, irritable, or hopeless Not at all   Total Score PHQ 2 0       Learning Assessment:  No flowsheet data found. Fall Risk  No flowsheet data found. Coordination of Care:  1. Have you been to the ER, urgent care clinic since your last visit? Hospitalized since your last visit? NO    2. Have you seen or consulted any other health care providers outside of the [address] [place] [address] since your last visit? Include any pap smears or colon screening.  Yes, PCP [facility]

## 2022-01-21 ENCOUNTER — HOSPITAL ENCOUNTER (OUTPATIENT)
Dept: PREADMISSION TESTING | Age: 47
Discharge: HOME | End: 2022-01-21
Payer: OTHER GOVERNMENT

## 2022-01-21 LAB — SARS-COV-2, COV2: NORMAL

## 2022-01-21 PROCEDURE — U0003 INFECTIOUS AGENT DETECTION BY NUCLEIC ACID (DNA OR RNA); SEVERE ACUTE RESPIRATORY SYNDROME CORONAVIRUS 2 (SARS-COV-2) (CORONAVIRUS DISEASE [COVID-19]), AMPLIFIED PROBE TECHNIQUE, MAKING USE OF HIGH THROUGHPUT TECHNOLOGIES AS DESCRIBED BY CMS-2020-01-R: HCPCS

## 2022-01-23 LAB — SARS-COV-2, COV2NT: NOT DETECTED

## 2022-01-25 ENCOUNTER — ANESTHESIA (OUTPATIENT)
Dept: ENDOSCOPY | Age: 47
End: 2022-01-25
Payer: OTHER GOVERNMENT

## 2022-01-25 ENCOUNTER — ANESTHESIA EVENT (OUTPATIENT)
Dept: ENDOSCOPY | Age: 47
End: 2022-01-25
Payer: OTHER GOVERNMENT

## 2022-01-25 ENCOUNTER — HOSPITAL ENCOUNTER (OUTPATIENT)
Age: 47
Setting detail: OUTPATIENT SURGERY
Discharge: HOME | End: 2022-01-25
Attending: INTERNAL MEDICINE | Admitting: INTERNAL MEDICINE
Payer: OTHER GOVERNMENT

## 2022-01-25 VITALS
WEIGHT: 190 LBS | DIASTOLIC BLOOD PRESSURE: 86 MMHG | BODY MASS INDEX: 26.6 KG/M2 | RESPIRATION RATE: 16 BRPM | SYSTOLIC BLOOD PRESSURE: 115 MMHG | HEART RATE: 65 BPM | TEMPERATURE: 97.3 F | HEIGHT: 71 IN | OXYGEN SATURATION: 98 %

## 2022-01-25 PROCEDURE — 74011000250 HC RX REV CODE- 250: Performed by: NURSE ANESTHETIST, CERTIFIED REGISTERED

## 2022-01-25 PROCEDURE — 76040000019: Performed by: INTERNAL MEDICINE

## 2022-01-25 PROCEDURE — 77030042138 HC TBNG SPECIAL -A: Performed by: INTERNAL MEDICINE

## 2022-01-25 PROCEDURE — 74011250636 HC RX REV CODE- 250/636: Performed by: INTERNAL MEDICINE

## 2022-01-25 PROCEDURE — 74011250636 HC RX REV CODE- 250/636: Performed by: NURSE ANESTHETIST, CERTIFIED REGISTERED

## 2022-01-25 PROCEDURE — 77030037186 HC VLV ENDOSC STRL DEFENDO DISP MVAT -A: Performed by: INTERNAL MEDICINE

## 2022-01-25 PROCEDURE — 77030018831 HC SOL IRR H20 BAXT -A: Performed by: INTERNAL MEDICINE

## 2022-01-25 PROCEDURE — 43235 EGD DIAGNOSTIC BRUSH WASH: CPT | Performed by: INTERNAL MEDICINE

## 2022-01-25 PROCEDURE — 2709999900 HC NON-CHARGEABLE SUPPLY: Performed by: INTERNAL MEDICINE

## 2022-01-25 RX ORDER — NALOXONE HYDROCHLORIDE 0.4 MG/ML
0.1 INJECTION, SOLUTION INTRAMUSCULAR; INTRAVENOUS; SUBCUTANEOUS
Status: CANCELLED | OUTPATIENT
Start: 2022-01-25

## 2022-01-25 RX ORDER — FENTANYL CITRATE 50 UG/ML
50 INJECTION, SOLUTION INTRAMUSCULAR; INTRAVENOUS AS NEEDED
Status: CANCELLED | OUTPATIENT
Start: 2022-01-25

## 2022-01-25 RX ORDER — DIPHENHYDRAMINE HYDROCHLORIDE 50 MG/ML
12.5 INJECTION, SOLUTION INTRAMUSCULAR; INTRAVENOUS
Status: CANCELLED | OUTPATIENT
Start: 2022-01-25

## 2022-01-25 RX ORDER — SODIUM CHLORIDE 0.9 % (FLUSH) 0.9 %
5-40 SYRINGE (ML) INJECTION AS NEEDED
Status: CANCELLED | OUTPATIENT
Start: 2022-01-25

## 2022-01-25 RX ORDER — SODIUM CHLORIDE, SODIUM LACTATE, POTASSIUM CHLORIDE, CALCIUM CHLORIDE 600; 310; 30; 20 MG/100ML; MG/100ML; MG/100ML; MG/100ML
75 INJECTION, SOLUTION INTRAVENOUS CONTINUOUS
Status: DISCONTINUED | OUTPATIENT
Start: 2022-01-25 | End: 2022-01-25 | Stop reason: HOSPADM

## 2022-01-25 RX ORDER — SODIUM CHLORIDE, SODIUM LACTATE, POTASSIUM CHLORIDE, CALCIUM CHLORIDE 600; 310; 30; 20 MG/100ML; MG/100ML; MG/100ML; MG/100ML
25 INJECTION, SOLUTION INTRAVENOUS CONTINUOUS
Status: CANCELLED | OUTPATIENT
Start: 2022-01-25 | End: 2022-01-25

## 2022-01-25 RX ORDER — FLUMAZENIL 0.1 MG/ML
0.2 INJECTION INTRAVENOUS
Status: CANCELLED | OUTPATIENT
Start: 2022-01-25

## 2022-01-25 RX ORDER — GABAPENTIN 400 MG/1
600 CAPSULE ORAL 3 TIMES DAILY
COMMUNITY

## 2022-01-25 RX ORDER — LIDOCAINE HYDROCHLORIDE 20 MG/ML
INJECTION, SOLUTION EPIDURAL; INFILTRATION; INTRACAUDAL; PERINEURAL AS NEEDED
Status: DISCONTINUED | OUTPATIENT
Start: 2022-01-25 | End: 2022-01-25 | Stop reason: HOSPADM

## 2022-01-25 RX ORDER — ONDANSETRON 2 MG/ML
4 INJECTION INTRAMUSCULAR; INTRAVENOUS ONCE
Status: CANCELLED | OUTPATIENT
Start: 2022-01-25 | End: 2022-01-25

## 2022-01-25 RX ORDER — PROPOFOL 10 MG/ML
INJECTION, EMULSION INTRAVENOUS AS NEEDED
Status: DISCONTINUED | OUTPATIENT
Start: 2022-01-25 | End: 2022-01-25 | Stop reason: HOSPADM

## 2022-01-25 RX ADMIN — PROPOFOL 50 MG: 10 INJECTION, EMULSION INTRAVENOUS at 12:30

## 2022-01-25 RX ADMIN — LIDOCAINE HYDROCHLORIDE 180 MG: 20 INJECTION, SOLUTION EPIDURAL; INFILTRATION; INTRACAUDAL; PERINEURAL at 12:26

## 2022-01-25 RX ADMIN — PROPOFOL 100 MG: 10 INJECTION, EMULSION INTRAVENOUS at 12:26

## 2022-01-25 RX ADMIN — SODIUM CHLORIDE, POTASSIUM CHLORIDE, SODIUM LACTATE AND CALCIUM CHLORIDE 75 ML/HR: 600; 310; 30; 20 INJECTION, SOLUTION INTRAVENOUS at 09:51

## 2022-01-25 RX ADMIN — PROPOFOL 50 MG: 10 INJECTION, EMULSION INTRAVENOUS at 12:28

## 2022-01-25 NOTE — INTERVAL H&P NOTE
Update History & Physical    The Patient's History and Physical of January 25, 2022  The procedure was reviewed with the patient and I examined the patient. There was no change. The surgical site was confirmed by the patient and me. Plan:  The risk, benefits, expected outcome, and alternative to the recommended procedure have been discussed with the patient. Patient understands and wants to proceed with the procedure.     Electronically signed by Annabelle Martinez MD on 1/25/2022 at 11:15 AM

## 2022-01-25 NOTE — ANESTHESIA POSTPROCEDURE EVALUATION
Procedure(s):  EGD (RAPID). total IV anesthesia    Anesthesia Post Evaluation      Multimodal analgesia: multimodal analgesia used between 6 hours prior to anesthesia start to PACU discharge  Patient location during evaluation: PACU  Patient participation: complete - patient participated  Level of consciousness: awake and alert  Pain management: adequate  Airway patency: patent  Anesthetic complications: no  Cardiovascular status: acceptable  Respiratory status: acceptable  Hydration status: acceptable  Comments: Ok to discharge when standard criteria are met.    Post anesthesia nausea and vomiting:  none  Final Post Anesthesia Temperature Assessment:  Normothermia (36.0-37.5 degrees C)      INITIAL Post-op Vital signs:   Vitals Value Taken Time   /78 01/25/22 1235   Temp 36.1 °C (97 °F) 01/25/22 1235   Pulse 88 01/25/22 1235   Resp 15 01/25/22 1235   SpO2 98 % 01/25/22 1235

## 2022-01-25 NOTE — ANESTHESIA PREPROCEDURE EVALUATION
Relevant Problems   RESPIRATORY SYSTEM   (+) Asthma       Anesthetic History   No history of anesthetic complications            Review of Systems / Medical History  Patient summary reviewed, nursing notes reviewed and pertinent labs reviewed    Pulmonary            Asthma : well controlled    Comments: Mild asthma, albuterol once a week, no symptoms today   Neuro/Psych   Within defined limits           Cardiovascular  Within defined limits                Exercise tolerance: >4 METS     GI/Hepatic/Renal           PUD     Endo/Other      Hypothyroidism  Arthritis     Other Findings              Physical Exam    Airway  Mallampati: II  TM Distance: 4 - 6 cm  Neck ROM: normal range of motion   Mouth opening: Normal     Cardiovascular  Regular rate and rhythm,  S1 and S2 normal,  no murmur, click, rub, or gallop  Rhythm: regular  Rate: normal         Dental  No notable dental hx       Pulmonary  Breath sounds clear to auscultation               Abdominal  GI exam deferred       Other Findings            Anesthetic Plan    ASA: 2  Anesthesia type: total IV anesthesia          Induction: Intravenous  Anesthetic plan and risks discussed with: Patient

## 2022-01-25 NOTE — PROCEDURES
EGD procedure note    Date of procedure: 1/25/2022    Indication for procedure: Abdominal pain    Type of procedure: Upper endoscopy     Anesthesia classification: ASA class 2    Patient history and physical has been accomplished and documented. Patient is assessed and determined to be an appropriate candidate for planned procedure and sedation. The patient was assessed immediately prior to sedation. Sedation plan: MAC per anesthesia. Airway assessment: Range of motion: normal, mouth opening: Normal, visual obstruction: No.    PREOP EXAM:  Unchanged. VS: Reviewed  Gen: WDWN in NAD  CV: RRR, no murmur  Resp: CTAB  Abd: Soft, NTND, +BS  Extrem: No cyanosis or edema  Neuro: Awake and alert      Informed consent obtained: Yes. The indications, risks, including but not limited to bleeding, perforation, infection, death, potential for failure to visualize or diagnose neoplasia, alternatives, benefits, discussed in detail with the patient prior to the procedure. Patient understands and agrees to the procedure. Patient identity and procedure were verified, consent was obtained, and is consistent with the consent form in the patient's records. INSTRUMENT: Olympus upper endoscope    PROCEDURE FINDINGS:    OROPHARYNX: Normal    ESOPHAGUS:  Esophageal mucosa normal with no masses noted in the proximal, mid, and distal esophagus. No endoscopic features of eosinophilic esophagitis were noted. The Z-line was at 41 cm. and was normal.    No hiatal hernia was noted distally. STOMACH: Stomach was then evaluated including the cardia and fundus on retroflexion view. Evaluation of the gastric body, antrum, and pylorus were normal, including normal gastric mucosa with no masses, ulcers, or mucosal abnormalities.  Retroflexion view of the cardia and fundus were normal.     DUODENUM:  The duodenal bulb and descending duodenum were then evaluated and found to be normal including no masses, ulcers, or or mucosal abnormalities. SPECIMENS: none    ESTIMATED BLOOD LOSS:  None. COMPLICATIONS:  None     COMMENTS: none    Impression:  1. Normal upper endoscopy. No etiology explain the patient's abdominal pain. I am suspicious this may be nonulcer dyspepsia or irritable bowel syndrome. Recommendations:    1. Continue present PPI therapy. 2.  CCK HIDA scan is scheduled with result pending. 3.  Further recommendations pending clinical course as needed. 4.  If the above is unrevealing, then I feel his symptoms are functional in nature and not due to any GI pathology. 5.  Follow up as needed.        Socorro Interiano MD

## 2022-01-27 ENCOUNTER — OFFICE VISIT (OUTPATIENT)
Dept: ORTHOPEDIC SURGERY | Age: 47
End: 2022-01-27
Payer: OTHER GOVERNMENT

## 2022-01-27 VITALS
HEIGHT: 72 IN | TEMPERATURE: 97.1 F | BODY MASS INDEX: 27.68 KG/M2 | WEIGHT: 204.4 LBS | HEART RATE: 74 BPM | OXYGEN SATURATION: 99 %

## 2022-01-27 DIAGNOSIS — M62.838 MUSCLE SPASM: ICD-10-CM

## 2022-01-27 DIAGNOSIS — M48.02 CERVICAL SPINAL STENOSIS: ICD-10-CM

## 2022-01-27 DIAGNOSIS — M54.12 CERVICAL RADICULOPATHY: Primary | ICD-10-CM

## 2022-01-27 DIAGNOSIS — M48.02 FORAMINAL STENOSIS OF CERVICAL REGION: ICD-10-CM

## 2022-01-27 DIAGNOSIS — M47.812 CERVICAL FACET JOINT SYNDROME: ICD-10-CM

## 2022-01-27 PROCEDURE — 99213 OFFICE O/P EST LOW 20 MIN: CPT | Performed by: PHYSICAL MEDICINE & REHABILITATION

## 2022-01-27 RX ORDER — DIAZEPAM 5 MG/1
TABLET ORAL
Qty: 20 TABLET | Refills: 0 | Status: SHIPPED | OUTPATIENT
Start: 2022-01-27 | End: 2022-08-01

## 2022-01-27 RX ORDER — METHYLPREDNISOLONE 4 MG/1
TABLET ORAL
Qty: 1 DOSE PACK | Refills: 0 | Status: SHIPPED | OUTPATIENT
Start: 2022-01-27 | End: 2022-08-01

## 2022-01-27 RX ORDER — METHYLPREDNISOLONE 4 MG/1
TABLET ORAL
COMMUNITY
Start: 2021-07-08 | End: 2022-01-27 | Stop reason: SDUPTHER

## 2022-01-27 NOTE — PROGRESS NOTES
[facility]  [address] [facility], [facility]   Phone: [phone]  Fax: [phone]    Pt's YOB: 1975    ASSESSMENT   Diagnoses and all orders for this visit:    1. Cervical radiculopathy  -     methylPREDNISolone (MEDROL DOSEPACK) 4 mg tablet; Use as directed    2. Foraminal stenosis of cervical region    3. Muscle spasm  -     diazePAM (Valium) 5 mg tablet; Take 1-2 tabs by mouth in the evening as needed for severe muscle spasm. 4. Cervical facet joint syndrome  -     methylPREDNISolone (MEDROL DOSEPACK) 4 mg tablet; Use as directed    5. Cervical spinal stenosis         IMPRESSION AND PLAN:  Yehuda Zuniga is a 51 y.o. right hand dominant male with history of cervical pain. Pt complains of severe upper back and cervical pain and numbness in the left 1st and 2nd upper extremity digits with holding his head erect x 3 weeks with no inciting injury. Pt is taking Neurontin 400 mg 1 cap TID, has taken Robaxin 500 mg prescribed by Dr. Serene Cross without relief, and has taken the first doses of a Medrol Dosepak this afternoon. 1) Pt was given information on cervical arthritis and upper back exercises. 2) Pt was prescribed Valium 5 mg 1-2 tabs QHS as needed for severe muscle spasm. 3) Pt was advised to use his home traction unit as needed. 4) Cervical steroid injections discussed. Injections deferred pending results of medication evaluation. 5) Pt was advised to discuss vitamin D3 and zinc supplementation with his PCP. 6) Mr. Vicki Livingston has a reminder for a \"due or due soon\" health maintenance. I have asked that he contact his primary care provider, Bernardino Nunez DO, for follow-up on this health maintenance. 7)  demonstrated consistency with prescribing. 8) Pt was given a medrol dose pack for an acute flare of pain  Follow-up and Dispositions    · Return in about 2 months (around 3/27/2022) for Medication follow up. HISTORY OF PRESENT ILLNESS:  Niall Griffiths is a 51 y.o. right hand dominant male with history of cervical pain and presents to the office today for follow up. Pt complains of severe upper back and cervical pain and numbness in the left 1st and 2nd upper extremity digits with holding his head erect x 3 weeks. He notes no inciting incident for his pain, though he notes the previous exacerbation began after lying back in a dentist's chair. He notes that he has an inflatable cervical traction unit, a percussion massager, and a TheraCane at home. Pt is taking Neurontin 400 mg 1 cap TID, has taken Robaxin 500 mg prescribed by Dr. Tara Mcgrath without relief, and has taken the first doses of a Medrol Dosepak this afternoon. He notes he has previously taken Flexeril. He also notes undergoing 2 previous sets of cervical steroid injections. Pt reports that he followed up with Dr. Larry Castro in 09/2021, his pain had resolved at the time, and he was told he was not a surgical candidate. Pt at this time desires to proceed with medication evaluation. Pain Scale: 6/10    PCP: Cleveland Mckenzie DO     Past Medical History:   Diagnosis Date    Anxiety     Asthma     Back pain     DJD (degenerative joint disease) of cervical spine     Duodenal ulcer     Dysphagia     ED (erectile dysfunction)     Fatigue     Graves disease     HLD (hyperlipidemia)     Night terror disorder     Tremor of unknown origin     Ulcer         Social History     Socioeconomic History    Marital status:      Spouse name: Not on file    Number of children: Not on file    Years of education: Not on file    Highest education level: Not on file   Occupational History    Not on file   Tobacco Use    Smoking status: Never Smoker    Smokeless tobacco: Never Used   Substance and Sexual Activity    Alcohol use:  Yes    Drug use: No    Sexual activity: Not on file   Other Topics Concern    Not on file   Social History Narrative  Not on file     Social Determinants of Health     Financial Resource Strain:     Difficulty of Paying Living Expenses: Not on file   Food Insecurity:     Worried About Running Out of Food in the Last Year: Not on file    Cinthya of Food in the Last Year: Not on file   Transportation Needs:     Lack of Transportation (Medical): Not on file    Lack of Transportation (Non-Medical): Not on file   Physical Activity:     Days of Exercise per Week: Not on file    Minutes of Exercise per Session: Not on file   Stress:     Feeling of Stress : Not on file   Social Connections:     Frequency of Communication with Friends and Family: Not on file    Frequency of Social Gatherings with Friends and Family: Not on file    Attends [employer] Services: Not on file    Active Member of [address] [place] [address] or Organizations: Not on file    Attends Club or Organization Meetings: Not on file    Marital Status: Not on file   Intimate Partner Violence:     Fear of Current or Ex-Partner: Not on file    Emotionally Abused: Not on file    Physically Abused: Not on file    Sexually Abused: Not on file   Housing Stability:     Unable to Pay for Housing in the Last Year: Not on file    Number of [place] in the Last Year: Not on file    Unstable Housing in the Last Year: Not on file       Current Outpatient Medications   Medication Sig Dispense Refill    diazePAM (Valium) 5 mg tablet Take 1-2 tabs by mouth in the evening as needed for severe muscle spasm. 20 Tablet 0    methylPREDNISolone (MEDROL DOSEPACK) 4 mg tablet Use as directed 1 Dose Pack 0    gabapentin (NEURONTIN) 400 mg capsule Take 400 mg by mouth three (3) times daily.  omeprazole (PriLOSEC OTC) 20 mg tablet Take 20 mg by mouth daily.  fluticasone-salmeterol (ADVAIR DISKUS) 100-50 mcg/dose diskus inhaler Take 1 Puff by inhalation every twelve (12) hours.  SYNTHROID 150 mcg tablet 150 mcg daily.          Allergies   Allergen Reactions    Ciprofloxacin Other (comments), Itching and Rash    Pcn [Penicillins] Other (comments)    Penicillins Hives and Swelling         REVIEW OF SYSTEMS    Constitutional: Negative for fever, chills, or weight change. Respiratory: Negative for cough or shortness of breath. Cardiovascular: Negative for chest pain or palpitations. Gastrointestinal: Negative for acid reflux, change in bowel habits, or constipation. Genitourinary: Negative for dysuria and flank pain. Musculoskeletal: Positive for cervical and upper back pain. Skin: Negative for rash. Neurological: Negative for headaches and dizziness. Positive for right upper extremity 1st and 2nd digit numbness. Endo/Heme/Allergies: Negative for increased bruising. Psychiatric/Behavioral: Negative for difficulty with sleep. As per HPI    PHYSICAL EXAMINATION  Visit Vitals  Pulse 74   Temp 97.1 °F (36.2 °C) (Skin)   Ht 6' (1.829 m)   Wt 204 lb 6.4 oz (92.7 kg)   SpO2 99%   BMI 27.72 kg/m²       Constitutional: Awake, alert, and in no acute distress. Neurological: Sensation to light touch is intact. Skin: warm, dry, and intact. Musculoskeletal: No pain with extension, axial loading, or forward flexion. No pain with internal or external rotation of his hips. Negative straight leg raise bilaterally. Biceps  Triceps Deltoids Wrist Ext Wrist Flex Hand Intrin   Right +4/5 +4/5 +4/5 +4/5 +4/5 +4/5   Left +4/5 +4/5 +4/5 +4/5 +4/5 +4/5     IMAGING:    Cervical spine MRI from 7/17/2021 was personally reviewed with the patient and demonstrated:  Results from Orders Only encounter on 07/15/21     MRI CERV SPINE WO CONT     Narrative  Sagittal and axial multisequence MR images of the cervical spine were obtained.     HISTORY: Neck pain and left hand weakness.     Normal alignment. No compression fracture. No pathologic marrow signal.  Paraspinal soft tissues are unremarkable. No Chiari I malformation.  Spinal cord  shows normal signal intensity.     C2-C3, C3-C4, C4-C5: No disc herniation, cord contact or central stenosis. Patent right foramina. Mild left foraminal stenosis with uncovertebral and facet  hypertrophy     C5-C6: Small left paracentral disc protrusion, contacting spinal cord. No cord  edema. With uncovertebral hypertrophy, moderate right foraminal stenosis. Patent  left foramen.     C6-C7: Diffuse posterior disc bulge with mild central stenosis but no cord  contact. With uncovertebral and facet hypertrophy, severe left and moderately  severe right foraminal stenosis.     C7-T1: No disc herniation, central or foraminal stenosis.     Impression  1. Mild left foraminal stenosis with facet and uncovertebral facet hypertrophy  at C2-C3, C3-C4, C4-C5 , and worst at C6-C7. 2. Small left paracentral disc protrusion at C5-C6, contacting spinal cord. 3. Moderate right foraminal stenosis at C5-C6, moderately severe at C6-C7.     Left upper extremity EMG from 07/20/2018 was personally reviewed with the patient and demonstrated: This EMG study, as performed, shows a subtle abnormality in the form of the absence of H-reflex, as well as prolonged F-latency, from the ulnar nerve; however, there is no hard evidence to suggest cervical radiculopathy. No distal entrapment neuropathy is notes.        Written by Liana Reid, as dictated by Bill Daniel MD.  I, Dr. Bill Daniel confirm that all documentation is accurate.

## 2022-03-22 ENCOUNTER — HOSPITAL ENCOUNTER (OUTPATIENT)
Dept: NUCLEAR MEDICINE | Age: 47
Discharge: HOME | End: 2022-03-22
Attending: INTERNAL MEDICINE
Payer: OTHER GOVERNMENT

## 2022-03-22 DIAGNOSIS — R10.13 EPIGASTRIC PAIN: ICD-10-CM

## 2022-03-22 PROCEDURE — 78227 HEPATOBIL SYST IMAGE W/DRUG: CPT

## 2022-03-22 RX ORDER — KIT FOR THE PREPARATION OF TECHNETIUM TC 99M MEBROFENIN 45 MG/10ML
6 INJECTION, POWDER, LYOPHILIZED, FOR SOLUTION INTRAVENOUS
Status: COMPLETED | OUTPATIENT
Start: 2022-03-22 | End: 2022-03-22

## 2022-03-22 RX ADMIN — KIT FOR THE PREPARATION OF TECHNETIUM TC 99M MEBROFENIN 6 MILLICURIE: 45 INJECTION, POWDER, LYOPHILIZED, FOR SOLUTION INTRAVENOUS at 08:45

## 2022-03-22 NOTE — PROGRESS NOTES
[facility] HIDA scan result: Gallbladder ejection fraction is normal at 59%. This suggests normal gallbladder functioning and not the cause of the patient's symptoms. Please notify the patient of results.

## 2022-04-18 ENCOUNTER — TELEPHONE (OUTPATIENT)
Dept: ORTHOPEDIC SURGERY | Age: 47
End: 2022-04-18

## 2022-04-18 NOTE — TELEPHONE ENCOUNTER
Patient requesting refill of methylPREDNISolone (MEDROL DOSEPACK) 4 mg tablet called in to pharmacy on file [place]. Please advise patient once approved, [phone].

## 2022-04-20 NOTE — TELEPHONE ENCOUNTER
Patient contacted and told that per Dr Scotty Sanchez, he will need to be seen prior to receiving an RX. He states that he is going [place] seas and will not be back until sometime in May and that he will call back to schedule.

## 2022-08-02 PROBLEM — K56.2 CECAL BASCULE (HCC): Status: ACTIVE | Noted: 2022-08-02

## 2022-08-27 PROBLEM — R10.11 RIGHT UPPER QUADRANT PAIN: Status: ACTIVE | Noted: 2022-08-27

## 2023-01-31 RX ORDER — LEVOTHYROXINE SODIUM 0.15 MG/1
150 TABLET ORAL DAILY
COMMUNITY
Start: 2017-01-09

## 2023-01-31 RX ORDER — OMEPRAZOLE 20 MG/1
20 TABLET, DELAYED RELEASE ORAL DAILY
COMMUNITY

## 2023-01-31 RX ORDER — SULFAMETHOXAZOLE AND TRIMETHOPRIM 800; 160 MG/1; MG/1
1 TABLET ORAL 2 TIMES DAILY
COMMUNITY

## 2023-01-31 RX ORDER — METRONIDAZOLE 500 MG/1
500 TABLET ORAL 2 TIMES DAILY
COMMUNITY

## 2023-01-31 RX ORDER — TIZANIDINE 4 MG/1
TABLET ORAL
COMMUNITY
Start: 2022-06-15

## 2023-01-31 RX ORDER — FLUTICASONE PROPIONATE AND SALMETEROL 100; 50 UG/1; UG/1
1 POWDER RESPIRATORY (INHALATION) EVERY 12 HOURS
COMMUNITY

## 2023-01-31 RX ORDER — GABAPENTIN 400 MG/1
600 CAPSULE ORAL 3 TIMES DAILY
COMMUNITY

## (undated) DEVICE — GOWN,AURORA,FABRIC-REINFORCED,X-LARGE: Brand: MEDLINE

## (undated) DEVICE — Device: Brand: DEFENDO VALVE AND CONNECTOR KIT

## (undated) DEVICE — CONMED SCOPE SAVER BITE BLOCK, 20X27 MM: Brand: SCOPE SAVER

## (undated) DEVICE — TUBING INSUFFLATION CAP W/ EXT CARBON DIOX ENDO SMARTCAP

## (undated) DEVICE — SOL IRR STRL H2O 1000ML BTL --

## (undated) DEVICE — KIT COLON W/ 1.1OZ LUB AND 2 END

## (undated) DEVICE — SOL IRR STRL H2O 500ML STRL --

## (undated) DEVICE — ENDOGATOR TUBING FOR BOSTON SCIENTIFIC ENDOSTAT II PUMP, OLYMPUS OFP PUMP OR ENDO STRATUS PUMP: Brand: ENDOGATOR

## (undated) DEVICE — TUBING, SUCTION, 9/32" X 10', STRAIGHT: Brand: MEDLINE